# Patient Record
Sex: FEMALE | NOT HISPANIC OR LATINO | Employment: OTHER | URBAN - METROPOLITAN AREA
[De-identification: names, ages, dates, MRNs, and addresses within clinical notes are randomized per-mention and may not be internally consistent; named-entity substitution may affect disease eponyms.]

---

## 2017-04-21 DIAGNOSIS — Z13.6 SCREENING FOR CARDIOVASCULAR CONDITION: Primary | ICD-10-CM

## 2017-04-23 ASSESSMENT — ENCOUNTER SYMPTOMS
MYALGIAS: 0
MUSCLE WEAKNESS: 0
HOT FLASHES: 0
TASTE DISTURBANCE: 0
SINUS CONGESTION: 0
NECK MASS: 0
SORE THROAT: 0
MUSCLE CRAMPS: 0
STIFFNESS: 0
ARTHRALGIAS: 1
JOINT SWELLING: 0
NECK PAIN: 0
DECREASED LIBIDO: 1
HOARSE VOICE: 0
SINUS PAIN: 0
SMELL DISTURBANCE: 0
TROUBLE SWALLOWING: 0
BACK PAIN: 0

## 2017-04-24 ENCOUNTER — OFFICE VISIT (OUTPATIENT)
Dept: CARDIOLOGY | Facility: CLINIC | Age: 67
End: 2017-04-24

## 2017-04-24 VITALS
HEIGHT: 64 IN | HEART RATE: 71 BPM | BODY MASS INDEX: 31.24 KG/M2 | DIASTOLIC BLOOD PRESSURE: 86 MMHG | OXYGEN SATURATION: 97 % | WEIGHT: 183 LBS | SYSTOLIC BLOOD PRESSURE: 136 MMHG

## 2017-04-24 DIAGNOSIS — I10 ESSENTIAL HYPERTENSION: Primary | ICD-10-CM

## 2017-04-24 DIAGNOSIS — E78.49 FAMILIAL HYPERLIPIDEMIA: ICD-10-CM

## 2017-04-24 DIAGNOSIS — Z13.6 SCREENING FOR CARDIOVASCULAR CONDITION: ICD-10-CM

## 2017-04-24 LAB
CHOLEST SERPL-MCNC: 288 MG/DL
CREAT UR-MCNC: 83 MG/DL
CRP SERPL HS-MCNC: 4.5 MG/L
FEF 25/75: NORMAL
FEV-1: NORMAL
FEV1/FVC: NORMAL
FVC: NORMAL
GLUCOSE SERPL-MCNC: 86 MG/DL (ref 70–99)
HDLC SERPL-MCNC: 55 MG/DL
LDLC SERPL CALC-MCNC: 192 MG/DL
MICROALBUMIN UR-MCNC: 6 MG/L
MICROALBUMIN/CREAT UR: 7.33 MG/G CR (ref 0–25)
NONHDLC SERPL-MCNC: 233 MG/DL
NT-PROBNP SERPL-MCNC: 182 PG/ML (ref 0–125)
TRIGL SERPL-MCNC: 207 MG/DL

## 2017-04-24 RX ORDER — FLUOXETINE 40 MG/1
40 CAPSULE ORAL DAILY
COMMUNITY
Start: 1994-01-01 | End: 2022-06-30 | Stop reason: DRUGHIGH

## 2017-04-24 NOTE — LETTER
"4/24/2017    RE: Jeanie Pavon  [address]  [facility] [address]       Dear Colleague,    Thank you for the opportunity to participate in the care of your patient, Jeanie Pavon, at the [facility] FOR CARDIOVASCULAR DISEASE PREVENTION at [facility]. Please see a copy of my visit note below.    [facility] for Cardiovascular Disease Prevention - Exam Note    Active Problems   Patient Active Problem List    Diagnosis Date Noted     Familial hyperlipidemia      Priority: Medium     pt reports onset two years ago but labs from 2005 elevated LDL       Depression      Priority: Medium     Fatigue      Priority: Medium       Reason For Visit   Patient here for [facility] early detection of atherosclerosis and CVD exam.    Pain Evaluation  Current history of pain associated with this visit is: denied    HPI   Jeanie Pavon is a 67 year old year old female with a history of familial hyperlipidemia with LDL levels exceeding 200, mildly elevated triglycerides, meniere syndrome with coil placement in 1987, hx of alcohol abuse with no alcohol for 20 years and ongoing depression. Her brother recently had a cardiac arrest and survived and was found to have a \" maker\" lesion so she recently had a CAC scan with relatively low score of 27. Her provider prescribed Crestor which she has not started yet as she is wondering if she should take it with her low score. We discussed that given her familial hyperlipidemia she should take a statin but if Crestor/rosuvastastin although generic, still higher in cost may not be affordable for her atorvastatin could be considered. She has not chest pain or shortness of breath but reports palpitations about two months ago with heart racing too fast to count for about 20 minutes at night. No reoccurrence but she does not and then have a thudding for a few seconds. She is tired all of the time and does snore with possible sleep apnea. " She has some ascending aorta dilation, LA dilation and grade 1-2 diastolic filling pattern that may be influenced by sleep apnea. We discussed obtaining a sleep study.    Nutrition assessment per patient report:   Foods with fat/cholesterol (fried foods, fatty meats, junk food):  1 serving per day   Fruits and vegetables (  cup cooked, 1 cup raw):  4 servings per day  Caffeine (1 cup coffee, soda, etc):  1 serving per day  Alcohol servings (12 oz. beer, 4 oz. wine, 1  oz. in mixed drink):  0 servings  Calcium servings (dairy foods, 8 oz. milk, yogurt, cheese, ice cream):  3 servings per day  Salt/sodium use:  rarely  Special dietary habits:  None    Activity  Patient is active 3 times per week for 60 or more minutes with walking.     Laboratory Results Review  We discussed laboratory results today including lipids targets and how foods influence cholesterol.    Weight    A normal BMI of 25 is equal to 145 pounds.  The current BMI of 31.4 is high-risk range.  A weight reduction speed of two pounds per month for women  is recommended.    PMH   Past Medical History:   Diagnosis Date     Agatston coronary artery calcium score less than 100 2017    score of 27, 50-75% range     Depression      Familial hyperlipidemia     pt reports onset two years ago but labs from 2005 elevated LDL     Fatigue      History of alcohol abuse     no alcohol for 20 years     Meniere disease 1987    coil in right inner ear, endolymphatic shunt       PSH  Past Surgical History:   Procedure Laterality Date     DECOMPRESS, ENHANCE ENDOLYMPHATIC SAC Right 1987       Current Meds   Current Outpatient Prescriptions   Medication Sig Dispense Refill     FLUoxetine (PROZAC) 40 MG capsule Take 40 mg by mouth daily       aspirin 81 MG tablet Take 81 mg by mouth daily         Allergies      Allergies   Allergen Reactions     Codeine Anxiety and Palpitations       Family Hx   Family History   Problem Relation Age of Onset     Coronary Artery Disease  "Mother 60     CAB twice, CHF     KIDNEY DISEASE Mother      CEREBROVASCULAR DISEASE Father 88     Coronary Artery Disease Maternal Grandfather 60     Deep Vein Thrombosis Brother      Hyperlipidemia Brother      Coronary Artery Disease Brother      cardiac arrest survived  maker     HEART DISEASE Paternal Grandfather        Social History  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  She is  with 2 children. The children are over 18 years of age.     Enjoyment of life is 9-10 with 10 enjoys life fully.    Tobacco History  History   Smoking Status     Not on file   Smokeless Tobacco     Not on file       ROS  CONSTITUTIONAL:  No fever, chills, or sweats. No weight gain/loss.   EENT:  No visual disturbance, ear ache, epistaxis, sore throat  ALLERGIES/IMMUNOLOGIC:  Negative  RESPIRATORY:  No cough, hemoptysis  CARDIOVASCULAR:  As per HPI  GI:  No nausea, vomiting, hematemesis, melena  :  No urinary frequency, dysuria, or hematuria  INTEGUMENT:  Negative  PSYCHIATRIC:  Negative  NEURO:  Negative  ENDOCRINE:  Negative  MUSCULOSKELETAL:  Negative     Vital Signs   /86 (BP Location: Left arm, Patient Position: Chair, Cuff Size: Adult Regular)  Pulse 71  Ht 1.626 m (5' 4\")  Wt 83 kg (183 lb)  SpO2 97%  BMI 31.41 kg/m2      Waist: 37.5 inches  Hip: 43 inches    Physical Exam   In general, the patient is a pleasant female in no apparent distress.    HEENT: NC/AT.  PERRLA.  EOMI.  Sclerae white, not injected.  Nares clear.  Pharynx without erythema or exudate.  Dentition intact.    Neck: No adenopathy.  No thyromegaly.Carotids +4/4 bilaterally without bruits.  No jugular venous distension.   Lungs: CTA.  No ronchi, wheezes, rales.     Cor: RRR. Normal S1, S2 splits physiologically. No murmur, rub, click, or gallop. The PMI is in the 5th ICS in the midclavicular line. There is no heave.   Abdomen: Soft, nontender, nondistended. No organomegaly.  No bruits. "   Extremities: No clubbing, cyanosis, or edema. The pulses are +2/2 at the post-tibial sites bilaterally. No bruits are noted.    Recent Labs  Lab Results   Component Value Date    GLC 86 04/24/2017      Lab Results   Component Value Date    NTBNP 182 (H) 04/24/2017     No results found for: NTBNPI   Lab Results   Component Value Date    UCRR 83 04/24/2017      Lab Results   Component Value Date    MICROL 6 04/24/2017      No results found for: MICROALBUMIN   Lab Results   Component Value Date    CRP 4.5 04/24/2017      Lab Results   Component Value Date    CHOL 288 (H) 04/24/2017      Lab Results   Component Value Date    TRIG 207 (H) 04/24/2017      Lab Results   Component Value Date    HDL 55 04/24/2017      Lab Results   Component Value Date     (H) 04/24/2017      No results found for: VLDL   No results found for: CHOLHDLRATIO  Lab Results   Component Value Date    NHDL 233 (H) 04/24/2017                          Sheikh Test Results    BASIC SPIROMETRY: Summary of two attempts (see printout for details of results)  Results Estimated range for ht/age   FVC: 2.71 liter FVC: 2.34-3.68 liter   FEV1: 2.70 liter FEV1: 1.73-2.86 liter     History of asthma:  NO   History of respiratory infection current/recent:  NO     Spirometry Results:  normal      WALKING BLOOD PRESSURE RESPONSE (3 minute, 5 MET level walk)   Pre BP: 140/70 mmHg  3 min BP: 178/60 mmHg  1 min post BP: 160/80 mmHg    Pre HR: 70 bpm  3 min HR: 140 bpm  1 min post HR: 120 bpm       RETINAL VASCULAR ASSESSMENT   Left Eye Abnormality:  none  AV Ratio: 0.86    Right Eye Abnormality:  none  AV Ratio: 0.87     Retinal Assessment:  normal      ABDOMINAL AORTA ULTRASOUND (< 2.5 normal, borderline 2.5-2.9, abnormal > 3)   SupraIliac 1.61 cm    SupraRenal 1.78 cm    InfraRenal Proximal 1.59  cm    InfraRenal Distal 1.72 cm      Abdominal Aorta Assessment:  normal      LEFT VENTRICULAR ULTRASOUND MEASUREMENTS (adjusted for BSA)  LVIDD 47.7 mm   Septa  10.6 mm   Posterior 9.9 mm     Left Ventricular US Assessment:  normal      Carotid Artery IMT measurements report and plaques in the small area examined:   Left IMT 1.021 mm  Plaques none    Right IMT 0.709 mm  Plaques small plaque homogeneous size 2.9 mm in bulb area.  mm       ECG (see tracing):  normal sinus rhythm;  rate: 64 bpm      Arterial Elasticity per age and gender (see printout):   C1 9.65 mL/mmHg x 10  abnormal   C2 1.6 mL/mmHg x 100 abnormal   Supine blood pressure: 165/93 mmHg       Assessment:     Cardiovascular:  ECG normal sinus without ectopy rate 64. Low CAC score of 27, Echo with aortic dilation, LA dilation, diastolic dysfunction.  Sleep apnea may be an influencing factor as she is tired and does snore. Evaluation recommended. No hx of HTN but she does have elevated blood pressure now and then. No chest pain or shortness of breath. Dizziness now and then related to meniere. Hx of symptoms of tachycardia two months ago without reoccurrence. Consider event monitor should her symptoms continue. Hx of depression treated with Prozac effectively with enjoyment of life at 9-10. Sleep apnea if present may also increase depression  symptoms. TSH normal, Hg normal.     Blood Pressure:  Pre hypertensive to elevated range today (136/86 sitting to 165/93 supine) without antihypertensive medication. Arterial compliance/elaticity is low with increase risk for HTN.     Lipids:  LDL today at 192 consistent with previous results and familial hyperlipidemia. Mild triglyceride elevation with normal LFT and normal glucose. Provider prescribed Crestor which she has not started yet. She should start this provided it is affordable to her. She consumes a lot of bread and we discussed nutrition to support normal triglycerides and lower LDL.    Health Habit Summary:  Nutrition: Heart Healthy Eating:  some of the time   Exercise:  regularly active  Weight:  high-risk range  Tobacco Use:  past tobacco use; 40 year pk hx,  20 years 2 ppd, quit [id number] pack year history    Full report to follow prevention team review of test results with scanned final report.    Time spent for patient visit was 60 minutes with more than half the time spent on counseling and coordination of care.    MIREYA King CNP       CC  Patient Care Team:  Miryam Rai APRN CNP as Nurse Practitioner (Nurse Practitioner)  BARRY TOLENTINO            Answers for HPI/ROS submitted by the patient on 4/23/2017   General Symptoms: No  Skin Symptoms: No  HENT Symptoms: Yes  EYE SYMPTOMS: No  HEART SYMPTOMS: No  LUNG SYMPTOMS: No  INTESTINAL SYMPTOMS: No  URINARY SYMPTOMS: No  GYNECOLOGIC SYMPTOMS: Yes  BREAST SYMPTOMS: No  SKELETAL SYMPTOMS: Yes  BLOOD SYMPTOMS: No  NERVOUS SYSTEM SYMPTOMS: No  MENTAL HEALTH SYMPTOMS: No  Ear pain: No  Ear discharge: No  Hearing loss: Yes  Tinnitus: Yes  Nosebleeds: No  Congestion: No  Sinus pain: No  Trouble swallowing: No   Voice hoarseness: No  Mouth sores: No  Sore throat: No  Tooth pain: No  Gum tenderness: No  Bleeding gums: No  Change in taste: No  Change in sense of smell: No  Dry mouth: Yes  Hearing aid used: No  Neck lump: No  Back pain: No  Muscle aches: No  Neck pain: No  Swollen joints: No  Joint pain: Yes  Bone pain: No  Muscle cramps: No  Muscle weakness: No  Joint stiffness: No  Bone fracture: No  Bleeding or spotting between periods: No  Heavy or painful periods: No  Irregular periods: No  Vaginal discharge: No  Hot flashes: No  Vaginal dryness: Yes  Genital ulcers: No  Reduced libido: Yes  Painful intercourse: Yes  Difficulty with sexual arousal: Yes  Post-menopausal bleeding: No    Please do not hesitate to contact me if you have any questions/concerns.     Sincerely,     MIREYA King CNP

## 2017-04-24 NOTE — PROGRESS NOTES
"[facility] for Cardiovascular Disease Prevention - Exam Note    Active Problems   Patient Active Problem List    Diagnosis Date Noted     Familial hyperlipidemia      Priority: Medium     pt reports onset two years ago but labs from 2005 elevated LDL       Depression      Priority: Medium     Fatigue      Priority: Medium       Reason For Visit   Patient here for [facility] early detection of atherosclerosis and CVD exam.    Pain Evaluation  Current history of pain associated with this visit is: denied    HPI   Jeanie Pavon is a 67 year old year old female with a history of familial hyperlipidemia with LDL levels exceeding 200, mildly elevated triglycerides, meniere syndrome with coil placement in 1987, hx of alcohol abuse with no alcohol for 20 years and ongoing depression. Her brother recently had a cardiac arrest and survived and was found to have a \" maker\" lesion so she recently had a CAC scan with relatively low score of 27. Her provider prescribed Crestor which she has not started yet as she is wondering if she should take it with her low score. We discussed that given her familial hyperlipidemia she should take a statin but if Crestor/rosuvastastin although generic, still higher in cost may not be affordable for her atorvastatin could be considered. She has not chest pain or shortness of breath but reports palpitations about two months ago with heart racing too fast to count for about 20 minutes at night. No reoccurrence but she does not and then have a thudding for a few seconds. She is tired all of the time and does snore with possible sleep apnea. She has some ascending aorta dilation, LA dilation and grade 1-2 diastolic filling pattern that may be influenced by sleep apnea. We discussed obtaining a sleep study.    Nutrition assessment per patient report:   Foods with fat/cholesterol (fried foods, fatty meats, junk food):  1 serving per day   Fruits and vegetables (  cup cooked, 1 cup raw):  4 " servings per day  Caffeine (1 cup coffee, soda, etc):  1 serving per day  Alcohol servings (12 oz. beer, 4 oz. wine, 1  oz. in mixed drink):  0 servings  Calcium servings (dairy foods, 8 oz. milk, yogurt, cheese, ice cream):  3 servings per day  Salt/sodium use:  rarely  Special dietary habits:  None    Activity  Patient is active 3 times per week for 60 or more minutes with walking.     Laboratory Results Review  We discussed laboratory results today including lipids targets and how foods influence cholesterol.    Weight    A normal BMI of 25 is equal to 145 pounds.  The current BMI of 31.4 is high-risk range.  A weight reduction speed of two pounds per month for women  is recommended.    PMH   Past Medical History:   Diagnosis Date     Agatston coronary artery calcium score less than 100 2017    score of 27, 50-75% range     Depression      Familial hyperlipidemia     pt reports onset two years ago but labs from 2005 elevated LDL     Fatigue      History of alcohol abuse     no alcohol for 20 years     Meniere disease 1987    coil in right inner ear, endolymphatic shunt       PSH  Past Surgical History:   Procedure Laterality Date     DECOMPRESS, ENHANCE ENDOLYMPHATIC SAC Right 1987       Current Meds   Current Outpatient Prescriptions   Medication Sig Dispense Refill     FLUoxetine (PROZAC) 40 MG capsule Take 40 mg by mouth daily       aspirin 81 MG tablet Take 81 mg by mouth daily         Allergies      Allergies   Allergen Reactions     Codeine Anxiety and Palpitations       Family Hx   Family History   Problem Relation Age of Onset     Coronary Artery Disease Mother 60     CAB twice, CHF     KIDNEY DISEASE Mother      CEREBROVASCULAR DISEASE Father 88     Coronary Artery Disease Maternal Grandfather 60     Deep Vein Thrombosis Brother      Hyperlipidemia Brother      Coronary Artery Disease Brother      cardiac arrest survived  maker     HEART DISEASE Paternal Grandfather        Social History  Jeanie casas  "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  She is  with 2 children. The children are over 18 years of age.     Enjoyment of life is 9-10 with 10 enjoys life fully.    Tobacco History  History   Smoking Status     Not on file   Smokeless Tobacco     Not on file       ROS  CONSTITUTIONAL:  No fever, chills, or sweats. No weight gain/loss.   EENT:  No visual disturbance, ear ache, epistaxis, sore throat  ALLERGIES/IMMUNOLOGIC:  Negative  RESPIRATORY:  No cough, hemoptysis  CARDIOVASCULAR:  As per HPI  GI:  No nausea, vomiting, hematemesis, melena  :  No urinary frequency, dysuria, or hematuria  INTEGUMENT:  Negative  PSYCHIATRIC:  Negative  NEURO:  Negative  ENDOCRINE:  Negative  MUSCULOSKELETAL:  Negative     Vital Signs   /86 (BP Location: Left arm, Patient Position: Chair, Cuff Size: Adult Regular)  Pulse 71  Ht 1.626 m (5' 4\")  Wt 83 kg (183 lb)  SpO2 97%  BMI 31.41 kg/m2      Waist: 37.5 inches  Hip: 43 inches    Physical Exam   In general, the patient is a pleasant female in no apparent distress.    HEENT: NC/AT.  PERRLA.  EOMI.  Sclerae white, not injected.  Nares clear.  Pharynx without erythema or exudate.  Dentition intact.    Neck: No adenopathy.  No thyromegaly.Carotids +4/4 bilaterally without bruits.  No jugular venous distension.   Lungs: CTA.  No ronchi, wheezes, rales.     Cor: RRR. Normal S1, S2 splits physiologically. No murmur, rub, click, or gallop. The PMI is in the 5th ICS in the midclavicular line. There is no heave.   Abdomen: Soft, nontender, nondistended. No organomegaly.  No bruits.   Extremities: No clubbing, cyanosis, or edema. The pulses are +2/2 at the post-tibial sites bilaterally. No bruits are noted.    Recent Labs  Lab Results   Component Value Date    GLC 86 04/24/2017      Lab Results   Component Value Date    NTBNP 182 (H) 04/24/2017     No results found for: NTBNPI   Lab Results   Component Value Date    UCRR 83 04/24/2017      Lab " Results   Component Value Date    MICROL 6 04/24/2017      No results found for: MICROALBUMIN   Lab Results   Component Value Date    CRP 4.5 04/24/2017      Lab Results   Component Value Date    CHOL 288 (H) 04/24/2017      Lab Results   Component Value Date    TRIG 207 (H) 04/24/2017      Lab Results   Component Value Date    HDL 55 04/24/2017      Lab Results   Component Value Date     (H) 04/24/2017      No results found for: VLDL   No results found for: CHOLHDLRATIO  Lab Results   Component Value Date    NHDL 233 (H) 04/24/2017                          Sheikh Test Results    BASIC SPIROMETRY: Summary of two attempts (see printout for details of results)  Results Estimated range for ht/age   FVC: 2.71 liter FVC: 2.34-3.68 liter   FEV1: 2.70 liter FEV1: 1.73-2.86 liter     History of asthma:  NO   History of respiratory infection current/recent:  NO     Spirometry Results:  normal      WALKING BLOOD PRESSURE RESPONSE (3 minute, 5 MET level walk)   Pre BP: 140/70 mmHg  3 min BP: 178/60 mmHg  1 min post BP: 160/80 mmHg    Pre HR: 70 bpm  3 min HR: 140 bpm  1 min post HR: 120 bpm       RETINAL VASCULAR ASSESSMENT   Left Eye Abnormality:  none  AV Ratio: 0.86    Right Eye Abnormality:  none  AV Ratio: 0.87     Retinal Assessment:  normal      ABDOMINAL AORTA ULTRASOUND (< 2.5 normal, borderline 2.5-2.9, abnormal > 3)   SupraIliac 1.61 cm    SupraRenal 1.78 cm    InfraRenal Proximal 1.59  cm    InfraRenal Distal 1.72 cm      Abdominal Aorta Assessment:  normal      LEFT VENTRICULAR ULTRASOUND MEASUREMENTS (adjusted for BSA)  LVIDD 47.7 mm   Septa 10.6 mm   Posterior 9.9 mm     Left Ventricular US Assessment:  normal      Carotid Artery IMT measurements report and plaques in the small area examined:   Left IMT 1.021 mm  Plaques none    Right IMT 0.709 mm  Plaques small plaque homogeneous size 2.9 mm in bulb area.  mm       ECG (see tracing):  normal sinus rhythm;  rate: 64 bpm      Arterial Elasticity per age  and gender (see printout):   C1 9.65 mL/mmHg x 10  abnormal   C2 1.6 mL/mmHg x 100 abnormal   Supine blood pressure: 165/93 mmHg       Assessment:     Cardiovascular:  ECG normal sinus without ectopy rate 64. Low CAC score of 27, Echo with aortic dilation, LA dilation, diastolic dysfunction.  Sleep apnea may be an influencing factor as she is tired and does snore. Evaluation recommended. No hx of HTN but she does have elevated blood pressure now and then. No chest pain or shortness of breath. Dizziness now and then related to meniere. Hx of symptoms of tachycardia two months ago without reoccurrence. Consider event monitor should her symptoms continue. Hx of depression treated with Prozac effectively with enjoyment of life at 9-10. Sleep apnea if present may also increase depression  symptoms. TSH normal, Hg normal.     Blood Pressure:  Pre hypertensive to elevated range today (136/86 sitting to 165/93 supine) without antihypertensive medication. Arterial compliance/elaticity is low with increase risk for HTN.     Lipids:  LDL today at 192 consistent with previous results and familial hyperlipidemia. Mild triglyceride elevation with normal LFT and normal glucose. Provider prescribed Crestor which she has not started yet. She should start this provided it is affordable to her. She consumes a lot of bread and we discussed nutrition to support normal triglycerides and lower LDL.    Health Habit Summary:  Nutrition: Heart Healthy Eating:  some of the time   Exercise:  regularly active  Weight:  high-risk range  Tobacco Use:  past tobacco use; 40 year pk hx, 20 years 2 ppd, quit [id number] pack year history    Full report to follow prevention team review of test results with scanned final report.    Time spent for patient visit was 60 minutes with more than half the time spent on counseling and coordination of care.    MIREYA King CNP       CC  Patient Care Team:  Miryam Rai APRN CNP as Nurse Practitioner  (Nurse Practitioner)  BARRY TOLENTINO  Answers for HPI/ROS submitted by the patient on 4/23/2017   General Symptoms: No  Skin Symptoms: No  HENT Symptoms: Yes  EYE SYMPTOMS: No  HEART SYMPTOMS: No  LUNG SYMPTOMS: No  INTESTINAL SYMPTOMS: No  URINARY SYMPTOMS: No  GYNECOLOGIC SYMPTOMS: Yes  BREAST SYMPTOMS: No  SKELETAL SYMPTOMS: Yes  BLOOD SYMPTOMS: No  NERVOUS SYSTEM SYMPTOMS: No  MENTAL HEALTH SYMPTOMS: No  Ear pain: No  Ear discharge: No  Hearing loss: Yes  Tinnitus: Yes  Nosebleeds: No  Congestion: No  Sinus pain: No  Trouble swallowing: No   Voice hoarseness: No  Mouth sores: No  Sore throat: No  Tooth pain: No  Gum tenderness: No  Bleeding gums: No  Change in taste: No  Change in sense of smell: No  Dry mouth: Yes  Hearing aid used: No  Neck lump: No  Back pain: No  Muscle aches: No  Neck pain: No  Swollen joints: No  Joint pain: Yes  Bone pain: No  Muscle cramps: No  Muscle weakness: No  Joint stiffness: No  Bone fracture: No  Bleeding or spotting between periods: No  Heavy or painful periods: No  Irregular periods: No  Vaginal discharge: No  Hot flashes: No  Vaginal dryness: Yes  Genital ulcers: No  Reduced libido: Yes  Painful intercourse: Yes  Difficulty with sexual arousal: Yes  Post-menopausal bleeding: No

## 2017-04-28 RX ORDER — VALSARTAN 80 MG/1
80 TABLET ORAL DAILY
Qty: 90 TABLET | Refills: 0 | Status: SHIPPED | OUTPATIENT
Start: 2017-04-28 | End: 2019-12-09

## 2017-05-03 LAB — INTERPRETATION ECG - MUSE: NORMAL

## 2017-08-21 ENCOUNTER — RADIANT APPOINTMENT (OUTPATIENT)
Dept: GENERAL RADIOLOGY | Facility: CLINIC | Age: 67
End: 2017-08-21
Attending: PODIATRIST
Payer: COMMERCIAL

## 2017-08-21 ENCOUNTER — OFFICE VISIT (OUTPATIENT)
Dept: PODIATRY | Facility: CLINIC | Age: 67
End: 2017-08-21
Payer: COMMERCIAL

## 2017-08-21 VITALS
SYSTOLIC BLOOD PRESSURE: 114 MMHG | DIASTOLIC BLOOD PRESSURE: 62 MMHG | BODY MASS INDEX: 30.39 KG/M2 | WEIGHT: 178 LBS | HEIGHT: 64 IN

## 2017-08-21 DIAGNOSIS — M25.572 PAIN IN JOINTS OF BOTH FEET: ICD-10-CM

## 2017-08-21 DIAGNOSIS — M20.5X9 HALLUX LIMITUS, UNSPECIFIED LATERALITY: Primary | ICD-10-CM

## 2017-08-21 DIAGNOSIS — M25.571 PAIN IN JOINTS OF BOTH FEET: ICD-10-CM

## 2017-08-21 DIAGNOSIS — M20.12 HALLUX ABDUCTO VALGUS, BILATERAL: ICD-10-CM

## 2017-08-21 DIAGNOSIS — M20.11 HALLUX ABDUCTO VALGUS, BILATERAL: ICD-10-CM

## 2017-08-21 DIAGNOSIS — M20.12 HALLUX INTERPHALANGEUS, ACQUIRED, LEFT: ICD-10-CM

## 2017-08-21 DIAGNOSIS — M20.5X9 HALLUX LIMITUS, UNSPECIFIED LATERALITY: ICD-10-CM

## 2017-08-21 PROCEDURE — 99203 OFFICE O/P NEW LOW 30 MIN: CPT | Performed by: PODIATRIST

## 2017-08-21 PROCEDURE — 73630 X-RAY EXAM OF FOOT: CPT | Mod: LT

## 2017-08-21 PROCEDURE — 73630 X-RAY EXAM OF FOOT: CPT | Mod: RT

## 2017-08-21 NOTE — NURSING NOTE
"Chief Complaint   Patient presents with     Foot Problems     pain and swelling below both great toes x25 years       Initial /62 (BP Location: Left arm, Cuff Size: Adult Regular)  Ht 5' 4\" (1.626 m)  Wt 178 lb (80.7 kg)  BMI 30.55 kg/m2 Estimated body mass index is 30.55 kg/(m^2) as calculated from the following:    Height as of this encounter: 5' 4\" (1.626 m).    Weight as of this encounter: 178 lb (80.7 kg).  Medication Reconciliation: complete   Maritza Benson MA      "

## 2017-08-21 NOTE — MR AVS SNAPSHOT
After Visit Summary   8/21/2017    Jeanie Pavon    MRN: [id number]           Patient Information     Date Of Birth          1950        Visit Information        Provider Department      8/21/2017 8:15 AM Lavelle Segundo DPM [facility]        Today's Diagnoses     Hallux abducto valgus, bilateral    -  1    Hallux limitus, unspecified laterality        Pain in joints of both feet          Care Instructions    DR. SEGUNDO'S CLINIC LOCATIONS     MONDAY / FRIDAY - [facility] WEDNESDAY - [place]   [address] [address] [place] [address]   [phone] / [phone] / [phone]       THURSDAY - [place] SCHEDULE SURGERY: [phone]   [address] APPOINTMENTS: [phone]   [place] [address] AFTER HOURS: [phone] / [phone] BILLING QUESTIONS: [phone]        Body Mass Index (BMI)  Many things can cause foot and ankle problems. Foot structure, activity level, foot mechanics and injuries are common causes of pain.  One very important issue that often goes unmentioned, is body weight.  Extra weight can cause increased stress on muscles, ligaments, bones and tendons.  Sometimes just a few extra pounds is all it takes to put one over her/his threshold. Without reducing that stress, it can be difficult to alleviate pain. Some people are uncomfortable addressing this issue, but we feel it is important for you to think about it. As Foot &  Ankle specialists, our job is addressing the lower extremity problem and possible causes. Regarding extra body weight, we encourage patients to discuss diet and weight management plans with their primary care doctors. It is this team approach that gives you the best opportunity for pain relief and getting you back on your feet.      DEGENERATIVE ARTHRITIS OF THE BIG TOE JOINT   (hallux limitus/hallux rigidus)   Arthritis of the joint at the  "base of the big toe (metatarsophalangeal joint) has several causes. Usually it results from repetitive trauma to the joint, secondary to abnormal foot mechanics. Often it is hereditary. However, a one-time traumatic event can lead to arthritis. The condition doesn't improve with time, and even with treatment, can worsen. The cartilage wears out, joint surfaces are no longer smooth, bone rubs on bone, inflammation occurs with pain, and eventually bone spurs and loose fragments might develop.   The joint is often painful with activity, worse with flimsy shoes or walking barefoot, and it slowly progresses over time. A person might notice the toe \"locking up\" with walking. There often is an obvious, and irritating, bony bump on top of the foot. Shoes might be uncomfortable. In some people the pain is so bothersome that recreational activities sometimes even normal daily activities are difficult to perform.   The pain from this arthritis is likely a combination of joint jamming, cartilage loss and inflammation, and irritation from shoes rubbing on the bump. Sometimes other parts of the foot, leg, or back hurt from altering one's walk to compensate for the painful jOint.     Ways to help a person live with the discomfort include wearing a good, supportive shoe with a rigid, rocker-type bottom. An example is a hiking boot. A rigid sole minimizes bending of the joint, and therefore, joint motion and pain. Shoes with a high toe box allow for less rubbing on the bump. Avoiding barefoot walking, sandals, flip-flops and slippers usually helps.   Sometimes an insert or orthotic provides symptom relief. This might make shoe fit more difficult. Pads over the bump and occasionally injections into the joint provide relief.   Surgery for this condition is aimed towards alleviating pain. It does not cure the arthritis nor does it guarantee better joint motion. Depending on the condition of the metatarsophalangeal joint, there are " several surgiqal options:    1.  Cutting off the bony bump(s) and cleaning the joint    2.  Loosening the joint up by making cuts in the first metatarsal bone or the big toe bone and removing a small section of bone.    3.  Repositioning bone to minimize jamming of the joint.    4.  In severe cases, the joint is fused. By fusing the joint, it will never bend again. This resolves the pain, because it's the movement of a worn out jOint that causes pain. Oftentimes the operation involves a combination of these procedures and. requires the use of screws, pins, and/or a small surgical plate.     Healing after surgery requires about six weeks of protection. This allows the bone to heal. Maximum recovery takes about one year. The scar tissue and joint structures require this amount of time to finish the healing process. Expect stiffness, swelling and numbness during that time frame.   Surgery for arthritis of the metatarsophalangeal joint does involve side effects. Some side effects are predictable and others are less common but do occur. A scar will be visible and could be irritated by shoes. The shoe may rub on the screw or internal pin requiring surgical removal of these fixation devices. The screw and pin would likely be left in place for a full year. The first toe may remain stiff after surgery. The amount of stiffness is variable. Most people never regain normal motion of the first toe. This is due to scar tissue inherent to any surgery, in addition to the cumUlative effects of arthritis. Sometimes the big toe drifts to one side or the other. Joint fusion is one option to correct an unstable, drifting toe. This procedure straightens the toe, however, no motion remains.   All surgical procedures involve risk of infection, numbness, pain, delayed bone healing, osteotomy (bone cut) dislocation, blood clots, continued foot pain, etc. Arthritic joint surgery is quite complex and should not be taken lightly.    Any skin  incision can lead to infection. Deep infection might involve the bone and thus repeat surgery and six weeks of IV antibiotics. Scar tissue can cause nerve pain or numbness. his is generally temporary but can be permanent. We do not have treatments that cure nerve problems. Second toe pain could be related to altered mechanics and pressure transferred to the second toe. Delayed bone healing would lengthen the healing time. Some bones simply do not heal. This requires repeat surgery, electronic bone stimulation and/or extended protection. Smokers have an approximate 20% chance of poor bone healing. This is double that of a non-smoker. The bone cut may displace. This may need to be repaired with a second operation. Displacement can cause joint malalignment. Immobility after surgery can cause a blood clot in the legs and lungs. This could result in death.   Foot pain is complex. Most feet hurt for more than one reason. Operating on the arthritic   big toe joint will not necessarily create a pain free foot. Appropriate shoes, healthy body weight, avoidance of bare foot walking and moderation of activity will always be   necessary to enjoy foot comfort. Arthritis is incurable even with surgery.     Surgery for this type of arthritis is nevertheless quite successful. Most surgical patients are pleased with their foot following surgery. Many of the issues described above can be controlled by taking proper care of your foot during the healing process.   Cosmetic bump surgery is discouraged for the reasons listed above. A bump and joint that is comfortable when wearing appropriate shoes should simply be treated with appropriate shoes.   Your surgeon would be happy to fully describe any of the above issues. You should pursue a full understanding of the operation, recovery process and any potential problems that could develop.             Follow-ups after your visit        Who to contact     If you have questions or need follow  "up information about today's clinic visit or your schedule please contact [facility] directly at [phone].  Normal or non-critical lab and imaging results will be communicated to you by MyChart, letter or phone within 4 business days after the clinic has received the results. If you do not hear from us within 7 days, please contact the clinic through [employer] or phone. If you have a critical or abnormal lab result, we will notify you by phone as soon as possible.  Submit refill requests through [employer] or call your pharmacy and they will forward the refill request to us. Please allow 3 business days for your refill to be completed.          Additional Information About Your Visit        My[employer] Information     [employer] gives you secure access to your electronic health record. If you see a primary care provider, you can also send messages to your care team and make appointments. If you have questions, please call your primary care clinic.  If you do not have a primary care provider, please call [phone] and they will assist you.        Care EveryWhere ID     This is your Care EveryWhere ID. This could be used by other organizations to access your [place] medical records  [id number]        Your Vitals Were     Height BMI (Body Mass Index)                5' 4\" (1.626 m) 30.55 kg/m2           Blood Pressure from Last 3 Encounters:   08/21/17 114/62   04/24/17 136/86    Weight from Last 3 Encounters:   08/21/17 178 lb (80.7 kg)   04/24/17 183 lb (83 kg)              We Performed the Following     XR Foot Left G/E 3 Views        Primary Care Provider    None Specified       No primary provider on file.        Equal Access to Services     [facility] : Hadii jaime Clements, waromida luqadaha, qaybta kaalmasmita alonzo. So [facility] [phone].    ATENCIÓN: Si habla español, tiene a guardado disposición servicios gratuitos de asistencia lingüística. " Nicole prieto [phone].    We comply with applicable federal civil rights laws and [place] laws. We do not discriminate on the basis of race, color, national origin, age, disability sex, sexual orientation or gender identity.            Thank you!     Thank you for choosing [facility]  for your care. Our goal is always to provide you with excellent care. Hearing back from our patients is one way we can continue to improve our services. Please take a few minutes to complete the written survey that you may receive in the mail after your visit with us. Thank you!             Your Updated Medication List - Protect others around you: Learn how to safely use, store and throw away your medicines at www.disposemymeds.org.          This list is accurate as of: 8/21/17  8:55 AM.  Always use your most recent med list.                   Brand Name Dispense Instructions for use Diagnosis    aspirin 81 MG tablet      Take 81 mg by mouth daily    Familial hyperlipidemia       PROZAC 40 MG capsule   Generic drug:  FLUoxetine      Take 40 mg by mouth daily    Familial hyperlipidemia       valsartan 80 MG tablet    DIOVAN    90 tablet    Take 1 tablet (80 mg) by mouth daily    Essential hypertension

## 2017-08-21 NOTE — PATIENT INSTRUCTIONS
DR. SEGUNDO'S CLINIC LOCATIONS     MONDAY / FRIDAY - [facility] WEDNESDAY - [place]   [address] [place] [address] [place] [id number]   [phone] / [phone] / [phone]       THURSDAY - [place] SCHEDULE SURGERY: [phone]   [address] APPOINTMENTS: [phone]   [place] [address] AFTER HOURS: [phone] BILLING QUESTIONS: [phone]        Body Mass Index (BMI)  Many things can cause foot and ankle problems. Foot structure, activity level, foot mechanics and injuries are common causes of pain.  One very important issue that often goes unmentioned, is body weight.  Extra weight can cause increased stress on muscles, ligaments, bones and tendons.  Sometimes just a few extra pounds is all it takes to put one over her/his threshold. Without reducing that stress, it can be difficult to alleviate pain. Some people are uncomfortable addressing this issue, but we feel it is important for you to think about it. As Foot &  Ankle specialists, our job is addressing the lower extremity problem and possible causes. Regarding extra body weight, we encourage patients to discuss diet and weight management plans with their primary care doctors. It is this team approach that gives you the best opportunity for pain relief and getting you back on your feet.      DEGENERATIVE ARTHRITIS OF THE BIG TOE JOINT   (hallux limitus/hallux rigidus)   Arthritis of the joint at the base of the big toe (metatarsophalangeal joint) has several causes. Usually it results from repetitive trauma to the joint, secondary to abnormal foot mechanics. Often it is hereditary. However, a one-time traumatic event can lead to arthritis. The condition doesn't improve with time, and even with treatment, can worsen. The cartilage wears out, joint surfaces are no longer smooth, bone rubs on bone, inflammation occurs with pain, and eventually bone spurs  "and loose fragments might develop.   The joint is often painful with activity, worse with flimsy shoes or walking barefoot, and it slowly progresses over time. A person might notice the toe \"locking up\" with walking. There often is an obvious, and irritating, bony bump on top of the foot. Shoes might be uncomfortable. In some people the pain is so bothersome that recreational activities sometimes even normal daily activities are difficult to perform.   The pain from this arthritis is likely a combination of joint jamming, cartilage loss and inflammation, and irritation from shoes rubbing on the bump. Sometimes other parts of the foot, leg, or back hurt from altering one's walk to compensate for the painful jOint.     Ways to help a person live with the discomfort include wearing a good, supportive shoe with a rigid, rocker-type bottom. An example is a hiking boot. A rigid sole minimizes bending of the joint, and therefore, joint motion and pain. Shoes with a high toe box allow for less rubbing on the bump. Avoiding barefoot walking, sandals, flip-flops and slippers usually helps.   Sometimes an insert or orthotic provides symptom relief. This might make shoe fit more difficult. Pads over the bump and occasionally injections into the joint provide relief.   Surgery for this condition is aimed towards alleviating pain. It does not cure the arthritis nor does it guarantee better joint motion. Depending on the condition of the metatarsophalangeal joint, there are several surgiqal options:    1.  Cutting off the bony bump(s) and cleaning the joint    2.  Loosening the joint up by making cuts in the first metatarsal bone or the big toe bone and removing a small section of bone.    3.  Repositioning bone to minimize jamming of the joint.    4.  In severe cases, the joint is fused. By fusing the joint, it will never bend again. This resolves the pain, because it's the movement of a worn out jOint that causes pain. " Oftentimes the operation involves a combination of these procedures and. requires the use of screws, pins, and/or a small surgical plate.     Healing after surgery requires about six weeks of protection. This allows the bone to heal. Maximum recovery takes about one year. The scar tissue and joint structures require this amount of time to finish the healing process. Expect stiffness, swelling and numbness during that time frame.   Surgery for arthritis of the metatarsophalangeal joint does involve side effects. Some side effects are predictable and others are less common but do occur. A scar will be visible and could be irritated by shoes. The shoe may rub on the screw or internal pin requiring surgical removal of these fixation devices. The screw and pin would likely be left in place for a full year. The first toe may remain stiff after surgery. The amount of stiffness is variable. Most people never regain normal motion of the first toe. This is due to scar tissue inherent to any surgery, in addition to the cumUlative effects of arthritis. Sometimes the big toe drifts to one side or the other. Joint fusion is one option to correct an unstable, drifting toe. This procedure straightens the toe, however, no motion remains.   All surgical procedures involve risk of infection, numbness, pain, delayed bone healing, osteotomy (bone cut) dislocation, blood clots, continued foot pain, etc. Arthritic joint surgery is quite complex and should not be taken lightly.    Any skin incision can lead to infection. Deep infection might involve the bone and thus repeat surgery and six weeks of IV antibiotics. Scar tissue can cause nerve pain or numbness. his is generally temporary but can be permanent. We do not have treatments that cure nerve problems. Second toe pain could be related to altered mechanics and pressure transferred to the second toe. Delayed bone healing would lengthen the healing time. Some bones simply do not heal.  This requires repeat surgery, electronic bone stimulation and/or extended protection. Smokers have an approximate 20% chance of poor bone healing. This is double that of a non-smoker. The bone cut may displace. This may need to be repaired with a second operation. Displacement can cause joint malalignment. Immobility after surgery can cause a blood clot in the legs and lungs. This could result in death.   Foot pain is complex. Most feet hurt for more than one reason. Operating on the arthritic   big toe joint will not necessarily create a pain free foot. Appropriate shoes, healthy body weight, avoidance of bare foot walking and moderation of activity will always be   necessary to enjoy foot comfort. Arthritis is incurable even with surgery.     Surgery for this type of arthritis is nevertheless quite successful. Most surgical patients are pleased with their foot following surgery. Many of the issues described above can be controlled by taking proper care of your foot during the healing process.   Cosmetic bump surgery is discouraged for the reasons listed above. A bump and joint that is comfortable when wearing appropriate shoes should simply be treated with appropriate shoes.   Your surgeon would be happy to fully describe any of the above issues. You should pursue a full understanding of the operation, recovery process and any potential problems that could develop.

## 2017-08-21 NOTE — PROGRESS NOTES
ASSESSMENT/PLAN:    Encounter Diagnoses   Name Primary?     Hallux limitus, unspecified laterality Yes     Pain in joints of both feet      Hallux interphalangeus, acquired, left        The cause and nature of hallux limitus/1st metatarsophalangeal joint degenerative joint disease was discussed.  An informational handout was provided.      I recommended that she try stiffer soled shoes with good support.   The AVS on hallux limitus was provided. We looked at the x-rays together.     We also reviewed surgical intervention.  .  I explained that the goals of surgery are to reduce pain by loosening up the joint, promoteing increased ROM, and removing spurs.  However, it was made clear that DJD is progressive, even after surgery.  Surgery is meant to buy time, and ultimately a joint fusion might be needed.      Body mass index is 30.55 kg/(m^2).    Weight management plan: Patient was referred to their PCP to discuss a diet and exercise plan.      Lavelle Rico DPM, FACFAS, MS    [facility] of Podiatry/Foot & Ankle Surgery      ____________________________________________________________________    HPI:          Chief Complaint: sore feet  Onset of problem: 20 years  Painful around the bilateral 1st metatarsophalangeal joints  Ratin/10 at worst   Frequency:  daily    The pain is made worse with weight bearing activity  Both feet hurt equally     *  Past Medical History:   Diagnosis Date     Agatston coronary artery calcium score less than 100 2017    score of 27, 50-75% range     Depression      Familial hyperlipidemia     pt reports onset two years ago but labs from  elevated LDL     Fatigue      History of alcohol abuse     no alcohol for 20 years     Meniere disease     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  All other areas are negative.     Numbness in feet?  no   Calf pain with walking? no  Recent foot/ankle injury? no  Weight change over past 12 months? 10#  Self perception as overweight? yes  Recent flu-like symptoms? no  Joint pain other than feet ? hips    Social History: Employment:  no;  Exercise/Physical activity:  Walking 3x/ week; biking;  Tobacco use:  no  Social History     Social History     Marital status:      Spouse name: N/A     Number of children: N/A     Years of education: N/A     Occupational History     Not on file.     Social History Main Topics     Smoking status: Never Smoker     Smokeless tobacco: Not on file     Alcohol use Not on file     Drug use: Not on file     Sexual activity: Not on file     Other Topics Concern     Not on file     Social History Narrative       Family history:  Family History   Problem Relation Age of Onset     Coronary Artery Disease Mother 60     CAB twice, CHF     KIDNEY DISEASE Mother      CEREBROVASCULAR DISEASE Father 88     Coronary Artery Disease Maternal Grandfather 60     Deep Vein Thrombosis Brother      Hyperlipidemia Brother      Coronary Artery Disease Brother      cardiac arrest survived  maker     HEART DISEASE Paternal Grandfather        EXAM:    Vitals: /62 (BP Location: Left arm, Cuff Size: Adult Regular)  Ht 5' 4\" (1.626 m)  Wt 178 lb (80.7 kg)  BMI 30.55 kg/m2  BMI: Body mass index is 30.55 kg/(m^2).  Height: 5' 4\"    Constitutional/ general:  Pt is in no apparent distress, appears well-nourished.  Cooperative with history and physical exam.     Vascular:  " Pedal pulses are palpable bilaterally for both the DP and PT arteries.  CFT < 3 sec.  No edema.  Pedal hair growth noted.     Neuro:  Alert and oriented x 3. Coordinated gait.  Light touch sensation is intact to the L4, L5, S1 distributions. No obvious deficits.  No evidence of neurological-based weakness, spasticity, or contracture in the lower extremities.     Derm: Normal texture and turgor.  No erythema, ecchymosis, or cyanosis.  No open lesions.     Musculoskeletal:    Lower extremity muscle strength is normal.  Patient is ambulatory without an assistive device or brace .  Decrease in medial longitudinal arch with weight bearing.   Hallux abduction bilateral.  Limited bilateral 1st metatarsophalangeal joint ROM with loading of the forefoot.     Radiographic Exam:  X-Ray Findings:  I personally reviewed the films.   Both feet show joint space narrowing, sclerosis, dorsal spurring at the 1st metatarsophalangeal joint.   The left hallux has a significant interphalangeal joint deformity.    Lavelle Rico DPM, DIRK, MS    [place] Department of Podiatry/Foot & Ankle Surgery

## 2019-02-22 ENCOUNTER — OFFICE VISIT (OUTPATIENT)
Dept: PLASTIC SURGERY | Facility: CLINIC | Age: 69
End: 2019-02-22
Payer: COMMERCIAL

## 2019-02-22 DIAGNOSIS — H02.831 DERMATOCHALASIS OF BOTH UPPER EYELIDS: Primary | ICD-10-CM

## 2019-02-22 DIAGNOSIS — H57.819 BROW PTOSIS: ICD-10-CM

## 2019-02-22 DIAGNOSIS — H02.834 DERMATOCHALASIS OF BOTH UPPER EYELIDS: Primary | ICD-10-CM

## 2019-02-22 NOTE — PROGRESS NOTES
Facial Plastic and Reconstructive Surgery Consultation    Referring Provider:   Referred Self, MD  No address on file    HPI:   I had the pleasure of seeing Jeanie Pavon today in clinic for consultation for visual obstruction in the periorbital area.    FUNCTIONAL COMPLAINTS RELATED TO DROOPY EYELIDS/BROWS:  Jeanie Pavon describes upper lids interfering with superior visual field and interfering with activities of daily living including reading, driving and watching television. She has to support her eyelids and brows in order to be able to read and finds that it leads to a significant improvement.     Denies dry eyes and does not use drops or ointment. States she can make tears but hasnt cried rené long time and believes this is due to emotional numbing from prozac.    PMH: recovering alcoholic/drug dependence, Meniere's disease   PSH: Surgery for meniere's disease 1987      Review Of Systems  ROS: 10 point ROS neg other than the symptoms noted above in the HPI.    Patient Active Problem List   Diagnosis     Familial hyperlipidemia     Depression     Fatigue     Hallux limitus, unspecified laterality     Past Surgical History:   Procedure Laterality Date     DECOMPRESS, ENHANCE ENDOLYMPHATIC SAC Right 1987     Current Outpatient Medications   Medication Sig Dispense Refill     aspirin 81 MG tablet Take 81 mg by mouth daily       FLUoxetine (PROZAC) 40 MG capsule Take 40 mg by mouth daily       valsartan (DIOVAN) 80 MG tablet Take 1 tablet (80 mg) by mouth daily (Patient not taking: Reported on 8/21/2017) 90 tablet 0     Codeine  Social History     Socioeconomic History     Marital status:      Spouse name: Not on file     Number of children: Not on file     Years of education: Not on file     Highest education level: Not on file   Occupational History     Not on file   Social Needs     Financial resource strain: Not on file     Food insecurity:     Worry: Not on file     Inability: Not on file      Transportation needs:     Medical: Not on file     Non-medical: Not on file   Tobacco Use     Smoking status: Never Smoker   Substance and Sexual Activity     Alcohol use: Not on file     Drug use: Not on file     Sexual activity: Not on file   Lifestyle     Physical activity:     Days per week: Not on file     Minutes per session: Not on file     Stress: Not on file   Relationships     Social connections:     Talks on phone: Not on file     Gets together: Not on file     Attends Congregational service: Not on file     Active member of club or organization: Not on file     Attends meetings of clubs or organizations: Not on file     Relationship status: Not on file     Intimate partner violence:     Fear of current or ex partner: Not on file     Emotionally abused: Not on file     Physically abused: Not on file     Forced sexual activity: Not on file   Other Topics Concern     Not on file   Social History Narrative     Not on file     Family History   Problem Relation Age of Onset     Coronary Artery Disease Mother 60        CAB twice, CHF     Kidney Disease Mother      Cerebrovascular Disease Father 88     Coronary Artery Disease Maternal Grandfather 60     Deep Vein Thrombosis Brother      Hyperlipidemia Brother      Coronary Artery Disease Brother         cardiac arrest survived  maker     Heart Disease Paternal Grandfather        PE:  Alert and Oriented, Answering Questions Appropriately  Atraumatic, Normocephalic, Face Symmetric  Skin: Novak 2  Facial Nerve Intact and facial movement symmetric  EOM's, PEERL  Dermatochalasis with excess skin touching the eyelids  Brow ptosis with brow resting below the superior orbital rim  Lids resting on eyelashes obstructing the temporal visual axis  Manual elevation of the brows leads to improved sight reported by the patient bilaterally.  Neuro/Psych: CN's 2-12 intact, Moves all extremities, ambulation in intact, positive affect, no notable muscle weakness       IMPRESSION:    Brow ptosis bilaterally  Dermatochalasis  Visual field obstruction      PLAN:    Jeanie Pavon has notable skin and soft tissue laxity in the periorbital area that warrants evaluation with visual field testing. She is quite symptomatic.    Referral to ophthalmology for comprehensive eye exam and visual field testing    She will need both brow lift and bilateral upper blephs and we gave some information about her condition today. We will meet in the future to discuss the specifics of surgery.    Photodocumentation was obtained.     I spent a total of 30 minutes face-to-face with Jeanie Pavon during today's office visit.  Over 50% of this time was spent counseling the patient and/or coordinating care regarding symptoms of visual obstruction.  See note for details.

## 2019-02-22 NOTE — LETTER
February 22, 2019  Re: Jeanie Pavon  1950    Dear Dr. Mills,    Thank you so much for referring Jeanie Pavon to the [facility]. I had the pleasure of visiting with Jeanie today.     Attached you will find a copy of my note. Please feel free to reach out to me with any questions, [phone].     Facial Plastic and Reconstructive Surgery Consultation    Referring Provider:   Referred Self, MD  No address on file    HPI:   I had the pleasure of seeing Jeanie Pavon today in clinic for consultation for visual obstruction in the periorbital area.    FUNCTIONAL COMPLAINTS RELATED TO DROOPY EYELIDS/BROWS:  Jeanie Pavon describes upper lids interfering with superior visual field and interfering with activities of daily living including reading, driving and watching television. She has to support her eyelids and brows in order to be able to read and finds that it leads to a significant improvement.     Denies dry eyes and does not use drops or ointment. States she can make tears but hasnt cried rené long time and believes this is due to emotional numbing from prozac.    PMH: recovering alcoholic/drug dependence, Meniere's disease   PSH: Surgery for meniere's disease 1987      Review Of Systems  ROS: 10 point ROS neg other than the symptoms noted above in the HPI.    Patient Active Problem List   Diagnosis     Familial hyperlipidemia     Depression     Fatigue     Hallux limitus, unspecified laterality     Past Surgical History:   Procedure Laterality Date     DECOMPRESS, ENHANCE ENDOLYMPHATIC SAC Right 1987     Current Outpatient Medications   Medication Sig Dispense Refill     aspirin 81 MG tablet Take 81 mg by mouth daily       FLUoxetine (PROZAC) 40 MG capsule Take 40 mg by mouth daily       valsartan (DIOVAN) 80 MG tablet Take 1 tablet (80 mg) by mouth daily (Patient not taking: Reported on 8/21/2017) 90 tablet 0     Codeine  Social History     Socioeconomic History     Marital status:      Spouse  name: Not on file     Number of children: Not on file     Years of education: Not on file     Highest education level: Not on file   Occupational History     Not on file   Social Needs     Financial resource strain: Not on file     Food insecurity:     Worry: Not on file     Inability: Not on file     Transportation needs:     Medical: Not on file     Non-medical: Not on file   Tobacco Use     Smoking status: Never Smoker   Substance and Sexual Activity     Alcohol use: Not on file     Drug use: Not on file     Sexual activity: Not on file   Lifestyle     Physical activity:     Days per week: Not on file     Minutes per session: Not on file     Stress: Not on file   Relationships     Social connections:     Talks on phone: Not on file     Gets together: Not on file     Attends [employer] service: Not on file     Active member of club or organization: Not on file     Attends meetings of clubs or organizations: Not on file     Relationship status: Not on file     Intimate partner violence:     Fear of current or ex partner: Not on file     Emotionally abused: Not on file     Physically abused: Not on file     Forced sexual activity: Not on file   Other Topics Concern     Not on file   Social History Narrative     Not on file     Family History   Problem Relation Age of Onset     Coronary Artery Disease Mother 60        CAB twice, CHF     Kidney Disease Mother      Cerebrovascular Disease Father 88     Coronary Artery Disease Maternal Grandfather 60     Deep Vein Thrombosis Brother      Hyperlipidemia Brother      Coronary Artery Disease Brother         cardiac arrest survived  maker     Heart Disease Paternal Grandfather        PE:  Alert and Oriented, Answering Questions Appropriately  Atraumatic, Normocephalic, Face Symmetric  Skin: Novak 2  Facial Nerve Intact and facial movement symmetric  EOM's, PEERL  Dermatochalasis with excess skin touching the eyelids  Brow ptosis with brow resting below the  superior orbital rim  Lids resting on eyelashes obstructing the temporal visual axis  Manual elevation of the brows leads to improved sight reported by the patient bilaterally.  Neuro/Psych: CN's 2-12 intact, Moves all extremities, ambulation in intact, positive affect, no notable muscle weakness      IMPRESSION:    Brow ptosis bilaterally  Dermatochalasis  Visual field obstruction      PLAN:    Jeanie Pavon has notable skin and soft tissue laxity in the periorbital area that warrants evaluation with visual field testing. She is quite symptomatic.    Referral to ophthalmology for comprehensive eye exam and visual field testing    She will need both brow lift and bilateral upper blephs and we gave some information about her condition today. We will meet in the future to discuss the specifics of surgery.    Photodocumentation was obtained.     I spent a total of 30 minutes face-to-face with Jeanie Pavon during today's office visit.  Over 50% of this time was spent counseling the patient and/or coordinating care regarding symptoms of visual obstruction.  See note for details.             Your trust in our practice and care is much appreciated.    Sincerely,  Shari Pizarro MD

## 2019-02-22 NOTE — NURSING NOTE
Photodocumentation obtained - see media tab.      Referral placed to Opthomology for Eye Exam and Visual Field Testing.    Patient to follow up with Dr. Shanta Cardoso after her eye appt to further discuss surgery.      Gave patient my card.    Pooja Milton RN  2/22/2019 11:21 AM

## 2019-03-01 ENCOUNTER — TELEPHONE (OUTPATIENT)
Dept: OTOLARYNGOLOGY | Facility: CLINIC | Age: 69
End: 2019-03-01

## 2019-03-04 ENCOUNTER — DOCUMENTATION ONLY (OUTPATIENT)
Dept: CARE COORDINATION | Facility: CLINIC | Age: 69
End: 2019-03-04

## 2019-03-06 ENCOUNTER — ALLIED HEALTH/NURSE VISIT (OUTPATIENT)
Dept: OPHTHALMOLOGY | Facility: CLINIC | Age: 69
End: 2019-03-06
Payer: COMMERCIAL

## 2019-03-06 ENCOUNTER — OFFICE VISIT (OUTPATIENT)
Dept: OPHTHALMOLOGY | Facility: CLINIC | Age: 69
End: 2019-03-06
Payer: COMMERCIAL

## 2019-03-06 DIAGNOSIS — H02.834 DERMATOCHALASIS OF BOTH UPPER EYELIDS: Primary | ICD-10-CM

## 2019-03-06 DIAGNOSIS — H02.831 DERMATOCHALASIS OF BOTH UPPER EYELIDS: Primary | ICD-10-CM

## 2019-03-06 DIAGNOSIS — H25.13 SENILE NUCLEAR SCLEROSIS, BILATERAL: ICD-10-CM

## 2019-03-06 DIAGNOSIS — H52.4 PRESBYOPIA: ICD-10-CM

## 2019-03-06 RX ORDER — BUPROPION HYDROCHLORIDE 150 MG/1
TABLET ORAL
Refills: 1 | COMMUNITY
Start: 2019-02-21

## 2019-03-06 RX ORDER — TRIAMTERENE AND HYDROCHLOROTHIAZIDE 37.5; 25 MG/1; MG/1
1 CAPSULE ORAL
COMMUNITY
Start: 2018-04-25 | End: 2022-06-30 | Stop reason: DRUGHIGH

## 2019-03-06 ASSESSMENT — REFRACTION_MANIFEST
OD_AXIS: 030
OD_SPHERE: +0.25
OS_AXIS: 150
OD_ADD: +2.50
OD_AXIS: 020
OS_SPHERE: -0.25
OD_CYLINDER: +0.25
OS_CYLINDER: +0.50
OS_SPHERE: -0.75
OS_AXIS: 150
OD_SPHERE: -0.75
OD_CYLINDER: +0.75
OS_CYLINDER: +0.75
OS_ADD: +2.50

## 2019-03-06 ASSESSMENT — CONF VISUAL FIELD
OS_NORMAL: 1
METHOD: COUNTING FINGERS
OD_NORMAL: 1

## 2019-03-06 ASSESSMENT — CUP TO DISC RATIO
OS_RATIO: 0.3
OD_RATIO: 0.3

## 2019-03-06 ASSESSMENT — TONOMETRY
IOP_METHOD: ICARE
OS_IOP_MMHG: 18
OD_IOP_MMHG: 18

## 2019-03-06 ASSESSMENT — VISUAL ACUITY
METHOD: SNELLEN - LINEAR
OS_SC+: -3
OD_SC: 20/20
OS_SC: 20/25

## 2019-03-06 ASSESSMENT — EXTERNAL EXAM - LEFT EYE: OS_EXAM: NORMAL

## 2019-03-06 ASSESSMENT — EXTERNAL EXAM - RIGHT EYE: OD_EXAM: NORMAL

## 2019-03-06 NOTE — NURSING NOTE
Chief Complaints and History of Present Illnesses   Patient presents with     COMPREHENSIVE EYE EXAM     Chief Complaint(s) and History of Present Illness(es)     COMPREHENSIVE EYE EXAM     Laterality: both eyes    Associated symptoms: floaters (longstanding floaters that have remained unchanged per pt).  Negative for flashes, dryness, tearing and eye pain              Comments     Patient notes she has a h/o ocular migraines, not bothersome, no other types of migraines.     Audrey Elliott March 6, 2019 10:43 AM

## 2019-03-06 NOTE — LETTER
Date:March 9, 2019      Patient was self referred, no letter generated. Do not send.        [facility] Information

## 2019-03-06 NOTE — PROGRESS NOTES
Assessment/Plan  (H02.831,  H02.834) Dermatochalasis of both upper eyelids  (primary encounter diagnosis)  Comment: Patient referred by Dr. Pizarro for complete exam. Patient being considered for blepharoplasty  Plan: Discussed findings with patient. Patient to continue care with Dr. Pizarro in preparation for surgery, visual field test sent to that provider for interpretation and consideration of surgery.     (H25.13) Senile nuclear sclerosis, bilateral  Comment: Not at surgical level.   Plan: Monitor annually for now. RTC with any vision changes, particularly new flashes/floaters/curtain over vision.     (H52.4) Presbyopia  Plan: REFRACTION [70530]        SRx updated and released. Monitor regularly for changes.       Complete documentation of historical and exam elements from today's encounter can  be found in the full encounter summary report (not reduplicated in this progress  note). I personally obtained the chief complaint(s) and history of present illness. I  confirmed and edited as necessary the review of systems, past medical/surgical  history, family history, social history, and examination findings as documented by  others; and I examined the patient myself. I personally reviewed the relevant tests,  images, and reports as documented above. I formulated and edited as necessary the  assessment and plan and discussed the findings and management plan with the  patient and family.    Ramone Caal OD

## 2019-09-29 ENCOUNTER — HEALTH MAINTENANCE LETTER (OUTPATIENT)
Age: 69
End: 2019-09-29

## 2019-12-09 ENCOUNTER — HOSPITAL ENCOUNTER (EMERGENCY)
Facility: CLINIC | Age: 69
Discharge: HOME | End: 2019-12-09
Attending: EMERGENCY MEDICINE | Admitting: EMERGENCY MEDICINE
Payer: COMMERCIAL

## 2019-12-09 VITALS
HEART RATE: 96 BPM | RESPIRATION RATE: 18 BRPM | SYSTOLIC BLOOD PRESSURE: 132 MMHG | DIASTOLIC BLOOD PRESSURE: 96 MMHG | OXYGEN SATURATION: 99 % | TEMPERATURE: 97.5 F

## 2019-12-09 DIAGNOSIS — R00.2 PALPITATIONS: ICD-10-CM

## 2019-12-09 DIAGNOSIS — H57.89 EYE REDNESS: ICD-10-CM

## 2019-12-09 PROCEDURE — 93005 ELECTROCARDIOGRAM TRACING: CPT

## 2019-12-09 PROCEDURE — 99283 EMERGENCY DEPT VISIT LOW MDM: CPT

## 2019-12-09 RX ORDER — TETRACAINE HYDROCHLORIDE 5 MG/ML
2 SOLUTION OPHTHALMIC ONCE
Status: DISCONTINUED | OUTPATIENT
Start: 2019-12-09 | End: 2019-12-10 | Stop reason: HOSPADM

## 2019-12-09 RX ORDER — POLYMYXIN B SULFATE AND TRIMETHOPRIM 1; 10000 MG/ML; [USP'U]/ML
1 SOLUTION OPHTHALMIC
Qty: 3 ML | Refills: 0 | Status: SHIPPED | OUTPATIENT
Start: 2019-12-09 | End: 2019-12-16

## 2019-12-09 ASSESSMENT — ENCOUNTER SYMPTOMS
PALPITATIONS: 1
EYE ITCHING: 1
EYE PAIN: 1
SHORTNESS OF BREATH: 0

## 2019-12-09 NOTE — ED AVS SNAPSHOT
[facility] Emergency Department  [address]  [facility] [address]  Phone:  [phone]  Fax:  [phone]                                    Jeanie Pavon   MRN: [id number]    Department:  [facility] Emergency Department   Date of Visit:  12/9/2019           After Visit Summary Signature Page    I have received my discharge instructions, and my questions have been answered. I have discussed any challenges I see with this plan with the nurse or doctor.    ..........................................................................................................................................  Patient/Patient Representative Signature      ..........................................................................................................................................  Patient Representative Print Name and Relationship to Patient    ..................................................               ................................................  Date                                   Time    ..........................................................................................................................................  Reviewed by Signature/Title    ...................................................              ..............................................  Date                                               Time          22EPIC Rev 08/18

## 2019-12-10 LAB — INTERPRETATION ECG - MUSE: NORMAL

## 2019-12-10 NOTE — DISCHARGE INSTRUCTIONS
See ophthalmologist tomorrow if still swollen and itchy  Start the antibiotic eye drops  Can try an oral antihistamine like claritin, benadryl before bed

## 2019-12-10 NOTE — ED PROVIDER NOTES
History     Chief Complaint:    Eye Pain     The history is provided by the patient.      Jeanie Pavon is a 69 year old female with a history of hyperlipidemia, Meniere's disease, and Factor V Leiden mutation who presents with left eye pain that began 2 hours ago. The patient's eyes felt irritated earlier today and she used eye drops before her left eye became more painful. The eye felt more itchy on the inside and is swollen now. She denies wearing glasses or contacts. She denies any vision issues. In addition, the patient experienced heart palpations, but they resolved after a few minutes. She was not concerned about them and was evaluated for palpitations at a heart clinic 2 years ago. The patient denies any chest pain or shortness of breath.    Allergies:  Alcohol  Codeine     Medications:    Wellbutrin XL  Prozac  Dyazide    Past Medical History:    Dermatochalasis of both upper eyelids  Brow ptosis  Hallux limitus, unspecified laterality  Hyperlipidemia  Depression  History of alcohol abuse, in remission  Meniere disease  Homozygous Factor V Leiden mutation  Vitamin D deficiency  Atrophy of vulva  Mixed, or nondependent drug abuse, in remission  Hearing loss, right  PAC    Past Surgical History:    Decompress, enhance endolymphatic sac  Tuboplasty or vasoplasty after previous sterilization  Tubal ligation  Inner ear surgery x 2    Family History:    Coronary artery disease - mother  CHF - mother  Kidney disease - mother  Macular degeneration - mother  Cerebrovascular disease - father  DVT - brother  Hyperlipidemia - brother  Coronary artery disease - brother  Vocal cord cancer - father  Throat cancer - father  Hyperlipidemia - mother  Hypertension - mother  Thyroid disease - mother  Crohn's disease - brother  Inflammatory bowel disease - brother  Factor V Leiden - brothers x 3  Sarah' Danlos syndrome - daughter  Pancreatic cancer - sister  Factor V Leiden - sister  Inflammatory bowel disease -  son    Social History:  Negative for tobacco use - former smoker.  Negative for alcohol use.  Negative for drug use.  Patient accompanied to the ER by her .  Marital Status:   [2]     Review of Systems   Eyes: Positive for pain (left) and itching (left). Negative for visual disturbance.        Left eye swollen   Respiratory: Negative for shortness of breath.    Cardiovascular: Positive for palpitations. Negative for chest pain.   All other systems reviewed and are negative.    Physical Exam     Patient Vitals for the past 24 hrs:   BP Temp Temp src Pulse Heart Rate Resp SpO2   12/09/19 2326 (!) 132/96 -- -- -- 86 -- 99 %   12/09/19 2140 (!) 167/103 97.5  F (36.4  C) Oral 96 96 18 99 %     Physical Exam    General: Resting comfortably on the gurney  Eyes:  The pupils are equal and round; reactive to light bilaterally. No afferent pupillary defect.     Mild conjunctival injection with scleral edema/swelling    EOMI    IOP R 15, 18. IOP L 15, 16.    Slit lamp: No cells or flare. No foreign body visualized. No fluoroscein uptake on left eye. No ulcer or dendrites. No evidence of open globe  ENT:    Moist mucous membranes; no swelling or erythema of eyelids  Neck:  Normal range of motion  CV:  Regular rate and rhythm    Skin warm and well perfused   Resp:  Non labored breathing on room air  MS:  Normal muscular tone  Skin:  No rash or acute skin lesions noted  Neuro:   Awake, alert.      Speech is normal and fluent.    Face is symmetric.     Moves all extremities equally  Psych:  Normal affect.  Appropriate interactions.    Emergency Department Course     ECG (22:07:38):  Rate 82 bpm. KY interval 182. QRS duration 88. QT/QTc 374/436. P-R-T axes 43 8 48. Normal sinus rhythm. Normal ECG. No significant change compared to EKG dated 4/24/17. Interpreted at 2209 by Barbi Gill MD.    Interventions:  2226: Ful-roshni 1 strip left eye  2226: Pontocaine 2 drop left eye    Emergency Department Course:  Past  medical records, nursing notes, and vitals reviewed.    2221: I performed an exam of the patient as documented above.     2259: I performed an eye exam of the patient.    EKG obtained in the ED, see results above.      I rechecked the patient and discussed the results of her workup thus far.     I personally reviewed the EKG results with the Patient and spouse and answered all related questions prior to discharge.     Findings and plan explained to the Patient and spouse. Patient discharged home with instructions regarding supportive care, medications, and reasons to return. The importance of close follow-up was reviewed.     Impression & Plan     Medical Decision Making:  Jeanie Pavon is a 69 year old female who presents for evaluation of eye pain.  A broad differential diagnosis was considered including bacterial conjunctivitis, viral conjunctivitis, foreign body, corneal abrasion, chemical vs allergic conjunctivitis, corneal ulcer, HSV, herpes zoster ophthalmicus, endophthalmitis, orbital cellulitis, etc.  No concerning etiology found today. Looks more like edema, possible allergic phenomenon. Will start antibiotics and recommended f/u with ophthalmologist tomorrow if not improved.  No red flag symptoms to suggest any of the above worrisome etiologies. No dendrite or ulcer seen on slit lamp exam. Patient also had palpitations. Pt mentioned this in passing and is not worried about this and does not want any additional workup for this done today. EKG with normal sinus rhythm with no arythmias or acute ischemic changes.    Discharge Diagnosis:    ICD-10-CM   1. Eye redness H57.89   2. Palpitations R00.2     Disposition:  Discharged to home.    Discharge Medications:  Discharge Medication List as of 12/9/2019 11:21 PM   START taking these medications    Details   trimethoprim-polymyxin b (POLYTRIM) 36199-0.1 UNIT/ML-% ophthalmic solution Place 1 drop Into the left eye every 3 hours for 7 days While awake, Disp-3 mL,  R-0, Local Print     Scribe Disclosure:  I, Eliza Coulter, am serving as a scribe at 10:22 PM on 12/9/2019 to document services personally performed by Barbi Gill MD based on my observations and the provider's statements to me.     Eliza Coulter  12/9/2019   [facility] EMERGENCY DEPARTMENT       Barbi Gill MD  12/10/19 0151

## 2019-12-10 NOTE — ED TRIAGE NOTES
Here swelling to left eye and itchiness since today. Denies any vision changes. Also c/o palpitations started tonight while watching tv, lasting about 5 minutes, currently resolve. Denies any chest pain or sob.

## 2020-03-15 ENCOUNTER — HEALTH MAINTENANCE LETTER (OUTPATIENT)
Age: 70
End: 2020-03-15

## 2021-01-14 ENCOUNTER — HEALTH MAINTENANCE LETTER (OUTPATIENT)
Age: 71
End: 2021-01-14

## 2021-05-09 ENCOUNTER — HEALTH MAINTENANCE LETTER (OUTPATIENT)
Age: 71
End: 2021-05-09

## 2021-10-24 ENCOUNTER — HEALTH MAINTENANCE LETTER (OUTPATIENT)
Age: 71
End: 2021-10-24

## 2022-05-10 ENCOUNTER — OFFICE VISIT (OUTPATIENT)
Dept: DERMATOLOGY | Facility: CLINIC | Age: 72
End: 2022-05-10
Payer: COMMERCIAL

## 2022-05-10 DIAGNOSIS — Z00.6 RESEARCH SUBJECT: Primary | ICD-10-CM

## 2022-05-10 PROCEDURE — 11104 PUNCH BX SKIN SINGLE LESION: CPT | Mod: GC

## 2022-05-10 PROCEDURE — 11105 PUNCH BX SKIN EA SEP/ADDL: CPT | Mod: GC

## 2022-05-10 NOTE — PROGRESS NOTES
[facility]  Nucleotide Excision Repair (NER) In Human Populations Study   May 10, 2022    Study Participant Name: Jeanie Pavon   : 1950    Study Participant: # [id number]    Study Procedures Performed:  1. Patient consented and consent form (English) signed. Patient provided copy of consent form.   2. Patient provided $75 [employer] as compensation for study participation; W-9 form completed.   3. NER questionnaire completed  4. Two 2 mm punch biopsies performed (see procedure note below)  5. Blood draw performed    Biopsy - Chronically Sun-Exposed Site (A): L dorsal hand  Punch biopsy:  After discussion of benefits and risks including but not limited to bleeding/bruising, pain/swelling, infection, scar, incomplete removal, nerve damage/numbness, recurrence, and non-diagnostic biopsy, written consent, verbal consent and photographs were obtained. Time-out was performed. The area was cleaned with isopropyl alcohol. 0.5mL of 1% lidocaine with epinephrine was injected to obtain adequate anesthesia of the lesion. A 2 mm punch biopsy was performed.  5-0 fast absorbing gut sutures were utilized to approximate the epidermal edges.  White petroleum jelly/VaselineTM and a bandage was applied to the wound.  Explicit verbal and written wound care instructions were provided.  The patient left the Dermatology Clinic in good condition.     Biopsy - Unexposed Site (B): L proximal inner arm  Punch biopsy:  After discussion of benefits and risks including but not limited to bleeding/bruising, pain/swelling, infection, scar, incomplete removal, nerve damage/numbness, recurrence, and non-diagnostic biopsy, written consent, verbal consent and photographs were obtained. Time-out was performed. The area was cleaned with isopropyl alcohol. 0.5mL of 1% lidocaine with epinephrine was injected to obtain adequate anesthesia of the lesion. A 2 mm punch biopsy was performed.  5-0 fast absorbing gut sutures were utilized to  approximate the epidermal edges.  White petroleum jelly/VaselineTM and a bandage was applied to the wound.  Explicit verbal and written wound care instructions were provided.  The patient left the Dermatology Clinic in good condition.    Staff Involved:  Meir Willis MD - Research Fellow  Km Fatima MD -     Km Fatima MD  Pronouns: he/him/his    Department of Dermatology  [facility]: Phone: [phone], Fax:[phone]  [facility]: Phone: [phone] Fax: [phone]

## 2022-05-10 NOTE — PATIENT INSTRUCTIONS
Wound Care After a Biopsy    What is a skin biopsy?  A skin biopsy allows the doctor to examine a very small piece of tissue under the microscope to determine the diagnosis and the best treatment for the skin condition. A local anesthetic (numbing medicine)  is injected with a very small needle into the skin area to be tested. A small piece of skin is taken from the area. Sometimes a suture (stitch) is used.     What are the risks of a skin biopsy?  I will experience scar, bleeding, swelling, pain, crusting and redness. I may experience incomplete removal or recurrence. Risks of this procedure are excessive bleeding, bruising, infection, nerve damage, numbness, thick (hypertrophic or keloidal) scar and non-diagnostic biopsy.    How should I care for my wound for the first 24 hours?  Keep the wound dry and covered for 24 hours  If it bleeds, hold direct pressure on the area for 15 minutes. If bleeding does not stop then go to the emergency room  Avoid strenuous exercise the first 1-2 days or as your doctor instructs you    How should I care for the wound after 24 hours?  After 24 hours, remove the bandage  You may bathe or shower as normal  If you had a scalp biopsy, you can shampoo as usual and can use shower water to clean the biopsy site daily  Clean the wound twice a day with gentle soap and water  Do not scrub, be gentle  Apply white petroleum/Vaseline after cleaning the wound with a cotton swab or a clean finger, and keep the site covered with a Bandaid /bandage. Bandages are not necessary with a scalp biopsy  If you are unable to cover the site with a Bandaid /bandage, re-apply ointment 2-3 times a day to keep the site moist. Moisture will help with healing  Avoid strenuous activity for first 1-2 days  Avoid lakes, rivers, pools, and oceans until the stitches are removed or the site is healed    How do I clean my wound?  Wash hands thoroughly with soap or use hand  before all wound care  Clean the  wound with gentle soap and water  Apply white petroleum/Vaseline  to wound after it is clean  Replace the Bandaid /bandage to keep the wound covered for the first few days or as instructed by your doctor  If you had a scalp biopsy, warm shower water to the area on a daily basis should suffice    What should I use to clean my wound?   Cotton-tipped applicators (Qtips )  White petroleum jelly (Vaseline ). Use a clean new container and use Q-tips to apply.  Bandaids   as needed  Gentle soap     How should I care for my wound long term?  Do not get your wound dirty  Keep up with wound care for one week or until the area is healed.  You should have some soreness but it should be mild and slowly go away over several days. Talk to your doctor about using tylenol for pain,    When should I call my doctor?  If you have increased:   Pain or swelling  Pus or drainage (clear or slightly yellow drainage is ok)  Temperature over 100F  Spreading redness or warmth around wound    When will I hear about my results?  The biopsy results can take 2 weeks to come back.  Your results will automatically release to [employer] before your provider has even reviewed them.  The clinic will call you with the results, send you a [employer] message, or have you schedule a follow-up clinic or phone time to discuss the results.  Contact our clinics if you do not hear from us in 2 weeks.    Who should I call with questions?  [facility]: [phone]  [facility]: [phone]  For urgent needs outside of business hours call the [facility] at [phone] and ask for the dermatology resident on call

## 2022-06-05 ENCOUNTER — HEALTH MAINTENANCE LETTER (OUTPATIENT)
Age: 72
End: 2022-06-05

## 2022-06-23 DIAGNOSIS — E78.49 FAMILIAL HYPERLIPIDEMIA: Primary | ICD-10-CM

## 2022-06-30 ENCOUNTER — OFFICE VISIT (OUTPATIENT)
Dept: CARDIOLOGY | Facility: CLINIC | Age: 72
End: 2022-06-30
Payer: COMMERCIAL

## 2022-06-30 ENCOUNTER — LAB (OUTPATIENT)
Dept: LAB | Facility: CLINIC | Age: 72
End: 2022-06-30

## 2022-06-30 VITALS
BODY MASS INDEX: 30.46 KG/M2 | HEART RATE: 68 BPM | HEIGHT: 64 IN | DIASTOLIC BLOOD PRESSURE: 77 MMHG | OXYGEN SATURATION: 97 % | SYSTOLIC BLOOD PRESSURE: 116 MMHG | WEIGHT: 178.4 LBS

## 2022-06-30 DIAGNOSIS — E78.49 FAMILIAL HYPERLIPIDEMIA: ICD-10-CM

## 2022-06-30 DIAGNOSIS — E78.49 FAMILIAL HYPERLIPIDEMIA: Primary | ICD-10-CM

## 2022-06-30 DIAGNOSIS — R09.89 OTHER SPECIFIED SYMPTOMS AND SIGNS INVOLVING THE CIRCULATORY AND RESPIRATORY SYSTEMS: ICD-10-CM

## 2022-06-30 DIAGNOSIS — I10 BENIGN ESSENTIAL HYPERTENSION: ICD-10-CM

## 2022-06-30 LAB
CHOLEST SERPL-MCNC: 280 MG/DL
CREAT UR-MCNC: 184 MG/DL
CRP SERPL HS-MCNC: 7.8 MG/L
FASTING STATUS PATIENT QL REPORTED: YES
FASTING STATUS PATIENT QL REPORTED: YES
GLUCOSE BLD-MCNC: 99 MG/DL (ref 70–99)
HDLC SERPL-MCNC: 54 MG/DL
LDLC SERPL CALC-MCNC: 187 MG/DL
MICROALBUMIN UR-MCNC: 15 MG/L
MICROALBUMIN/CREAT UR: 8.15 MG/G CR (ref 0–25)
NONHDLC SERPL-MCNC: 226 MG/DL
TRIGL SERPL-MCNC: 195 MG/DL

## 2022-06-30 PROCEDURE — 99215 OFFICE O/P EST HI 40 MIN: CPT | Mod: 25 | Performed by: NURSE PRACTITIONER

## 2022-06-30 PROCEDURE — 80061 LIPID PANEL: CPT | Performed by: PATHOLOGY

## 2022-06-30 PROCEDURE — 82947 ASSAY GLUCOSE BLOOD QUANT: CPT | Performed by: PATHOLOGY

## 2022-06-30 PROCEDURE — 86141 C-REACTIVE PROTEIN HS: CPT | Performed by: NURSE PRACTITIONER

## 2022-06-30 PROCEDURE — 82043 UR ALBUMIN QUANTITATIVE: CPT | Performed by: PATHOLOGY

## 2022-06-30 PROCEDURE — 93922 UPR/L XTREMITY ART 2 LEVELS: CPT | Performed by: NURSE PRACTITIONER

## 2022-06-30 PROCEDURE — 36415 COLL VENOUS BLD VENIPUNCTURE: CPT | Performed by: PATHOLOGY

## 2022-06-30 RX ORDER — HYDROCHLOROTHIAZIDE 12.5 MG/1
TABLET ORAL
COMMUNITY
Start: 2022-06-08

## 2022-06-30 NOTE — LETTER
6/30/2022      RE: Jeanie Pavon  [address]  [place] [address]       Dear Colleague,    Thank you for the opportunity to participate in the care of your patient, Jeanie Pavon, at the [facility] [place] at [facility]. Please see a copy of my visit note below.      [facility] Cardiovascular Disease Prevention - Exam Note    Active Problems   Patient Active Problem List    Diagnosis Date Noted     Dermatochalasis of both upper eyelids 02/22/2019     Priority: Medium     Brow ptosis 02/22/2019     Priority: Medium     Hallux limitus, unspecified laterality 08/21/2017     Priority: Medium     Familial hyperlipidemia      Priority: Medium     pt reports onset two years ago but labs from 2005 elevated LDL       Depression      Priority: Medium     Fatigue      Priority: Medium       Reason For Visit   Patient here for [facility] early detection of atherosclerosis and CVD exam.    Pain Evaluation  Current history of pain associated with this visit is: denied    Cardiac risk factors:  + age     + smoking (ex)   + elevated BMI     + Family history CVD    - Diet   + Hypertension    HPI   Jeanie Pavon is a 72 year old year old female with a family history of heart disease, hyperlipidemia, hypertension, meniere disease,, depression, and alcohol abuse.  CAC score of 27, 50-75th percentile 12/2017.  She was seen in the [facility] CV Prevention clinic in 2017, score 9. It was recommended that she start a statin medication but she has not wanted to do that. Her PCP has also recommended that she start a cholesterol medication.  Her primary care provider is LENARD Hernandez at [facility] in [place]. She is retired, she worked in high schools on art projects.  She has palpitation 3x/week that last for seconds. Today in clinic she denies chest pain at rest, with activity, while  sleeping, SOB at rest, with activity or while sleeping, palpitations, lightheadedness, lower leg edema, calf cramps, indigestion, headaches or issues with her memory.     Nutrition assessment per patient report:   Foods with fat/cholesterol (fried foods, fatty meats, junk food):  1x/month  Fruits and vegetables (  cup cooked, 1 cup raw):  1-3 servings of vegetables/day, fruit 1-3 servings/day  Caffeine (1 cup coffee, soda, etc):  1 cup coffee/day  Alcohol servings (12 oz. beer, 4 oz. wine, 1  oz. in mixed drink):  none X 33 years  Special dietary habits:  salt  Typical breakfast:  Peanut butter sandwich                 Lunch: pasta, fruit and vegetables                 Dinner: protein and vegetable                 Snacks: cheese, fruit                 Drinks:  water  Activity  Patient is not active, she used to have an exercise routine but does not currently    Sleep pattern:    Laboratory Results Review  We discussed laboratory results today including lipids targets and how foods influence cholesterol.    Weight  Her perceived healthy weight is 153 pounds.  A normal BMI of 25 is equal to 148 pounds.  The current BMI of 31.1 is overweight range.  A weight reduction speed of 1-2 lbs per month for women is recommended.    PMH   Past Medical History:   Diagnosis Date     Agatston coronary artery calcium score less than 100 2017    score of 27, 50-75% range     Benign essential hypertension      Depression      Familial hyperlipidemia     pt reports onset two years ago but labs from 2005 elevated LDL     Fatigue      History of alcohol abuse     no alcohol for 20 years     Meniere disease 1987    coil in right inner ear, endolymphatic shunt       PSH  Past Surgical History:   Procedure Laterality Date     DECOMPRESS, ENHANCE ENDOLYMPHATIC SAC Right 1987       Current Meds   Current Outpatient Medications   Medication Sig Dispense Refill     buPROPion (WELLBUTRIN XL) 150 MG 24 hr tablet   1     cholecalciferol 25 MCG  (1000 UT) TABS Take 1,000 Units by mouth       FLUoxetine (PROZAC) 20 MG capsule TAKE THREE CAPSULES BY MOUTH DAILY       hydrochlorothiazide (HYDRODIURIL) 12.5 MG tablet Take 1 Tablet (12.5 mg) by mouth daily.         Allergies      Allergies   Allergen Reactions     Alcohol Other (See Comments)     Sober 28 years     Codeine Anxiety and Palpitations       Family Hx   Family History   Problem Relation Age of Onset     Coronary Artery Disease Mother 60        s/pMI at age 60, s/p CABG X 2, CHF     Kidney Disease Mother         cause of death     Macular Degeneration Mother      Cerebrovascular Disease Father 88     Pancreatic Cancer Sister         cause of death     Deep Vein Thrombosis Sister      Coronary Artery Disease Brother 77        s/p CABG at age 77     Hypertension Brother      Deep Vein Thrombosis Brother      Hypertension Brother      Hypertension Brother      Crohn's Disease Brother      Hypertension Brother      Hyperlipidemia Brother      Hyperlipidemia Brother      Coronary Artery Disease Brother 62        s/p MI, s/p cardiac arrest (smoker)     Coronary Artery Disease Maternal Grandfather 60     Heart Disease Paternal Grandfather      Ankylosing Spondylitis Son      Sarah-Danlos syndrome Daughter      Glaucoma No family hx of        Social History  She is retired.   She is  with 2 children    Tobacco History  History   Smoking Status     Former Smoker     Packs/day: 2.00     Years: 20.00     Types: Cigarettes     Start date: 1967     Quit date: 1987   Smokeless Tobacco     Never Used       ROS  CONSTITUTIONAL:  No fever, chills, or sweats. No weight gain/loss.   EENT:  No visual disturbance, ear ache, epistaxis, sore throat  ALLERGIES/IMMUNOLOGIC:  Negative  RESPIRATORY:  No cough, hemoptysis  CARDIOVASCULAR:  As per HPI  GI:  No nausea, vomiting, hematemesis, melena  :  No urinary frequency, dysuria, or hematuria  INTEGUMENT:  Negative  PSYCHIATRIC:  Negative  NEURO:  Negative  ENDOCRINE:  " Negative  MUSCULOSKELETAL:  Negative     Vital Signs   /77 (BP Location: Right arm, Patient Position: Sitting, Cuff Size: Adult Regular)   Pulse 68   Ht 1.613 m (5' 3.5\")   Wt 80.9 kg (178 lb 6.4 oz)   SpO2 97%   BMI 31.11 kg/m        Waist: 38.5 inches  Hip: 43.5 inches    Physical Exam   In general, the patient is a pleasant female in no apparent distress   HEENT: NC/AT, PERRLA, EOMI, sclerae white, not injected. Nares clear, pharynx without erythema or exudate, dentition intact    Neck: No adenopathy, no thyromegaly, carotids +4/4 bilaterally without bruits,  no jugular venous distension   Lungs: Breath sounds clear bilaterally, without crackles, ronchi, or wheezes  Cor: RRR, S1S2 without murmur, rub, click, or gallop, the PMI is in the 5th ICS in the midclavicular line  Abdomen: Soft, nontender, nondistended, BS+ in all 4 quadrants, without hepatomegaly, no aorta or renal artery bruits  Extremities: No clubbing, cyanosis, or edema. DP and PT pulses +2/4 bilaterally    The 10-year ASCVD risk score (Padmaja MARIFER Jr., et al., 2013) is: 25.3%  Values used to calculate the score:   Age: 72 year old   Sex: female   Is Non- : No   Diabetic: No   Tobacco smoker: No   Systolic Blood Press: 129 mmHg   Is BP treated: Yes   HDL Cholesterol: 54 mg/dL   Total Cholesterol: 280 mg/dL    Recent Labs  Lab Results   Component Value Date    GLC 99 06/30/2022    GLC 86 04/24/2017      Lab Results   Component Value Date    NTBNP 182 (H) 04/24/2017     No results found for: NTBNPI   Lab Results   Component Value Date    UCRR 184 06/30/2022    UCRR 83 04/24/2017      Lab Results   Component Value Date    MICROL 15 06/30/2022    MICROL 6 04/24/2017      No results found for: MICROALBUMIN   Lab Results   Component Value Date    CRP 7.80 06/30/2022    CRP 4.5 04/24/2017      Lab Results   Component Value Date    CHOL 280 (H) 06/30/2022    CHOL 288 (H) 04/24/2017      Lab Results   Component Value Date    " TRIG 195 (H) 06/30/2022    TRIG 207 (H) 04/24/2017      Lab Results   Component Value Date    HDL 54 06/30/2022    HDL 55 04/24/2017      Lab Results   Component Value Date     (H) 06/30/2022     (H) 04/24/2017      No results found for: VLDL   No results found for: CHOLHDLRATIO  Lab Results   Component Value Date    NHDL 226 (H) 06/30/2022    NHDL 233 (H) 04/24/2017        Assessment:    Cardiovascular:  Asymptomatic, she is not complaining of chest pain, CAC in 2016, recommend repeating CAC      Blood Pressure:  She takes BP medication, hydrochlorothiazide 12.5 mg, -129/77-85 mmHg    Lipids:  She does not take a statin medication, recommend starting 10 mg of atorvastatin. She would like to start with a very low dose, 10 mg every other day X 2-4 weeks and slowly advance.  Recheck FLP 10-12 weeks after she is taking 10 mg every day.   !LIPID/HEPATIC Latest Ref Rng & Units 4/24/2017 6/30/2022   CHOL <200 mg/dL 288 (H) 280 (H)   TRIGLYCERIDES <150 mg/dL 207 (H) 195 (H)   HDL >=50 mg/dL 55 54   LDL <=100 mg/dL 192 (H) 187 (H)   NHDL <130 mg/dL 233 (H) 226 (H)   CRP mg/L 4.5        Glucose: 99    Sleep pattern:  Sleep hygiene reviewed during clinic visit, handout given to patient    Weight Management: BMI 31.1, refer to comprehensive weight management    Return to Clinic: 5 years    Health Habit Summary:  Nutrition: Heart Healthy Eating:  most of the time   Exercise:  not regularly active  Weight:  high-risk range  Tobacco Use:  ex-smoker    Full report to follow prevention team review of test results with scanned final report.    Time spent for patient visit was 60 minutes with more than half the time spent on counseling and coordination of care.    MIREYA Ellison CNP       CC  Patient Care Team:  Lexy Escamilla as PCP - General (Physician Assistant)  Miryam Rai APRN CNP as Nurse Practitioner (Nurse Practitioner)  Devi Gallegos APRN CNP as Nurse Practitioner (Cardiovascular  Disease)  AB HONG Test Results    WALKING BLOOD PRESSURE RESPONSE (3 minute, 5 MET level walk)   Pre BP: 122/80 mmHg  3 min BP: 148/72 mmHg  1 min post BP: 150/80 mmHg    Pre HR: 78 bpm  3 min HR: 140 bpm  1 min post HR: 80 bpm     Test results: Walking blood pressure response to 3 minutes activity is in normal range.     ABDOMINAL AORTA ULTRASOUND (< 2.5 normal, borderline 2.5-2.9, abnormal > 3)   SupraIliac 1.7 cm    SupraRenal 1.7 cm    InfraRenal Proximal 2.0 cm    InfraRenal Distal 1.9 cm      Abdominal Aorta Assessment:  normal    LEFT VENTRICULAR ULTRASOUND MEASUREMENTS (adjusted for BSA)  LVIDD 49.10 mm   Septa 8.4 mm   Posterior 8.8 mm     Left Ventricular US Assessment:  normal    Carotid Artery IMT measurements report and plaques in the small area examined:   Left IMT 1.041 mm  Plaques small heterogeneous plaque in left bulb area size 1.3 mm     Right IMT 0.642 mm  Plaques  small heterogeneous plaque in right bulb area size 2.7 mm.      Test results: carotid artery wall thickening is in abnormal range with plaque formations.     ECG (see tracing):  normal sinus rhythm;  rate: 68 bpm    Arterial Elasticity per age and gender (see printout):   C1 11.5 mL/mmHg x 10  normal   C2 2.3 mL/mmHg x 100 abnormal   Supine blood pressure: 139/83 mmHg     Test results: arterial elasticity of the large size arteries is in normal range after adjusting for age and gender. Arterial elasticity of the small size arteries is in abnormal range after adjusting for age and gender.       Alis Toure      Please do not hesitate to contact me if you have any questions/concerns.     Sincerely,     MIREYA Ellison CNP

## 2022-06-30 NOTE — LETTER
6/30/2022      RE: Jeanie Pavon  [id number]  [facility] [id number]         [facility] for Cardiovascular Disease Prevention - Exam Note    Active Problems   Patient Active Problem List    Diagnosis Date Noted     Dermatochalasis of both upper eyelids 02/22/2019     Priority: Medium     Brow ptosis 02/22/2019     Priority: Medium     Hallux limitus, unspecified laterality 08/21/2017     Priority: Medium     Familial hyperlipidemia      Priority: Medium     pt reports onset two years ago but labs from 2005 elevated LDL       Depression      Priority: Medium     Fatigue      Priority: Medium       Reason For Visit   Patient here for [facility] early detection of atherosclerosis and CVD exam.    Pain Evaluation  Current history of pain associated with this visit is: denied    Cardiac risk factors:  + age     + smoking (ex)   + elevated BMI     + Family history CVD    - Diet   + Hypertension    HPI   Jeanie Pavon is a 72 year old year old female with a family history of heart disease, hyperlipidemia, hypertension, meniere disease,, depression, and alcohol abuse.  CAC score of 27, 50-75th percentile 12/2017.  She was seen in the [facility] CV Prevention clinic in 2017, score 9. It was recommended that she start a statin medication but she has not wanted to do that. Her PCP has also recommended that she start a cholesterol medication.  Her primary care provider is LEANRD Hernandez at [facility] in [place]. She is retired, she worked in high schools on art projects.  She has palpitation 3x/week that last for seconds. Today in clinic she denies chest pain at rest, with activity, while sleeping, SOB at rest, with activity or while sleeping, palpitations, lightheadedness, lower leg edema, calf cramps, indigestion, headaches or issues with her memory.     Nutrition assessment per patient report:   Foods with fat/cholesterol (fried foods, fatty meats, junk food):  1x/month  Fruits and vegetables  (  cup cooked, 1 cup raw):  1-3 servings of vegetables/day, fruit 1-3 servings/day  Caffeine (1 cup coffee, soda, etc):  1 cup coffee/day  Alcohol servings (12 oz. beer, 4 oz. wine, 1  oz. in mixed drink):  none X 33 years  Special dietary habits:  salt  Typical breakfast:  Peanut butter sandwich                 Lunch: pasta, fruit and vegetables                 Dinner: protein and vegetable                 Snacks: cheese, fruit                 Drinks:  water  Activity  Patient is not active, she used to have an exercise routine but does not currently    Sleep pattern:    Laboratory Results Review  We discussed laboratory results today including lipids targets and how foods influence cholesterol.    Weight  Her perceived healthy weight is 153 pounds.  A normal BMI of 25 is equal to 148 pounds.  The current BMI of 31.1 is overweight range.  A weight reduction speed of 1-2 lbs per month for women is recommended.    PMH   Past Medical History:   Diagnosis Date     Agatston coronary artery calcium score less than 100 2017    score of 27, 50-75% range     Benign essential hypertension      Depression      Familial hyperlipidemia     pt reports onset two years ago but labs from 2005 elevated LDL     Fatigue      History of alcohol abuse     no alcohol for 20 years     Meniere disease 1987    coil in right inner ear, endolymphatic shunt       PSH  Past Surgical History:   Procedure Laterality Date     DECOMPRESS, ENHANCE ENDOLYMPHATIC SAC Right 1987       Current Meds   Current Outpatient Medications   Medication Sig Dispense Refill     buPROPion (WELLBUTRIN XL) 150 MG 24 hr tablet   1     cholecalciferol 25 MCG (1000 UT) TABS Take 1,000 Units by mouth       FLUoxetine (PROZAC) 20 MG capsule TAKE THREE CAPSULES BY MOUTH DAILY       hydrochlorothiazide (HYDRODIURIL) 12.5 MG tablet Take 1 Tablet (12.5 mg) by mouth daily.         Allergies      Allergies   Allergen Reactions     Alcohol Other (See Comments)     Sober 28 years  "    Codeine Anxiety and Palpitations       Family Hx   Family History   Problem Relation Age of Onset     Coronary Artery Disease Mother 60        s/pMI at age 60, s/p CABG X 2, CHF     Kidney Disease Mother         cause of death     Macular Degeneration Mother      Cerebrovascular Disease Father 88     Pancreatic Cancer Sister         cause of death     Deep Vein Thrombosis Sister      Coronary Artery Disease Brother 77        s/p CABG at age 77     Hypertension Brother      Deep Vein Thrombosis Brother      Hypertension Brother      Hypertension Brother      Crohn's Disease Brother      Hypertension Brother      Hyperlipidemia Brother      Hyperlipidemia Brother      Coronary Artery Disease Brother 62        s/p MI, s/p cardiac arrest (smoker)     Coronary Artery Disease Maternal Grandfather 60     Heart Disease Paternal Grandfather      Ankylosing Spondylitis Son      Sarah-Danlos syndrome Daughter      Glaucoma No family hx of        Social History  She is retired.   She is  with 2 children    Tobacco History  History   Smoking Status     Former Smoker     Packs/day: 2.00     Years: 20.00     Types: Cigarettes     Start date: 1967     Quit date: 1987   Smokeless Tobacco     Never Used       ROS  CONSTITUTIONAL:  No fever, chills, or sweats. No weight gain/loss.   EENT:  No visual disturbance, ear ache, epistaxis, sore throat  ALLERGIES/IMMUNOLOGIC:  Negative  RESPIRATORY:  No cough, hemoptysis  CARDIOVASCULAR:  As per HPI  GI:  No nausea, vomiting, hematemesis, melena  :  No urinary frequency, dysuria, or hematuria  INTEGUMENT:  Negative  PSYCHIATRIC:  Negative  NEURO:  Negative  ENDOCRINE:  Negative  MUSCULOSKELETAL:  Negative     Vital Signs   /77 (BP Location: Right arm, Patient Position: Sitting, Cuff Size: Adult Regular)   Pulse 68   Ht 1.613 m (5' 3.5\")   Wt 80.9 kg (178 lb 6.4 oz)   SpO2 97%   BMI 31.11 kg/m        Waist: 38.5 inches  Hip: 43.5 inches    Physical Exam   In general, " the patient is a pleasant female in no apparent distress   HEENT: NC/AT, PERRLA, EOMI, sclerae white, not injected. Nares clear, pharynx without erythema or exudate, dentition intact    Neck: No adenopathy, no thyromegaly, carotids +4/4 bilaterally without bruits,  no jugular venous distension   Lungs: Breath sounds clear bilaterally, without crackles, ronchi, or wheezes  Cor: RRR, S1S2 without murmur, rub, click, or gallop, the PMI is in the 5th ICS in the midclavicular line  Abdomen: Soft, nontender, nondistended, BS+ in all 4 quadrants, without hepatomegaly, no aorta or renal artery bruits  Extremities: No clubbing, cyanosis, or edema. DP and PT pulses +2/4 bilaterally    The 10-year ASCVD risk score (Pdamaja CAICEDO Jr., et al., 2013) is: 25.3%  Values used to calculate the score:   Age: 72 year old   Sex: female   Is Non- : No   Diabetic: No   Tobacco smoker: No   Systolic Blood Press: 129 mmHg   Is BP treated: Yes   HDL Cholesterol: 54 mg/dL   Total Cholesterol: 280 mg/dL    Recent Labs  Lab Results   Component Value Date    GLC 99 06/30/2022    GLC 86 04/24/2017      Lab Results   Component Value Date    NTBNP 182 (H) 04/24/2017     No results found for: NTBNPI   Lab Results   Component Value Date    UCRR 184 06/30/2022    UCRR 83 04/24/2017      Lab Results   Component Value Date    MICROL 15 06/30/2022    MICROL 6 04/24/2017      No results found for: MICROALBUMIN   Lab Results   Component Value Date    CRP 7.80 06/30/2022    CRP 4.5 04/24/2017      Lab Results   Component Value Date    CHOL 280 (H) 06/30/2022    CHOL 288 (H) 04/24/2017      Lab Results   Component Value Date    TRIG 195 (H) 06/30/2022    TRIG 207 (H) 04/24/2017      Lab Results   Component Value Date    HDL 54 06/30/2022    HDL 55 04/24/2017      Lab Results   Component Value Date     (H) 06/30/2022     (H) 04/24/2017      No results found for: VLDL   No results found for: CHOLHDLRATIO  Lab Results   Component  Value Date    NHDL 226 (H) 06/30/2022    NHDL 233 (H) 04/24/2017        Assessment:    Cardiovascular:  Asymptomatic, she is not complaining of chest pain, CAC in 2016, recommend repeating CAC      Blood Pressure:  She takes BP medication, hydrochlorothiazide 12.5 mg, -129/77-85 mmHg    Lipids:  She does not take a statin medication, recommend starting 10 mg of atorvastatin. She would like to start with a very low dose, 10 mg every other day X 2-4 weeks and slowly advance.  Recheck FLP 10-12 weeks after she is taking 10 mg every day.   !LIPID/HEPATIC Latest Ref Rng & Units 4/24/2017 6/30/2022   CHOL <200 mg/dL 288 (H) 280 (H)   TRIGLYCERIDES <150 mg/dL 207 (H) 195 (H)   HDL >=50 mg/dL 55 54   LDL <=100 mg/dL 192 (H) 187 (H)   NHDL <130 mg/dL 233 (H) 226 (H)   CRP mg/L 4.5        Glucose: 99    Sleep pattern:  Sleep hygiene reviewed during clinic visit, handout given to patient    Weight Management: BMI 31.1, refer to comprehensive weight management    Return to Clinic: 5 years    Health Habit Summary:  Nutrition: Heart Healthy Eating:  most of the time   Exercise:  not regularly active  Weight:  high-risk range  Tobacco Use:  ex-smoker    Full report to follow prevention team review of test results with scanned final report.    Time spent for patient visit was 60 minutes with more than half the time spent on counseling and coordination of care.    MIREYA lElison CNP       CC  Patient Care Team:  Lexy Escamilla as PCP - General (Physician Assistant)  Miryam Rai APRN CNP as Nurse Practitioner (Nurse Practitioner)  Ab Hong APRN CNP as Nurse Practitioner (Cardiovascular Disease)  AB HONG Test Results    WALKING BLOOD PRESSURE RESPONSE (3 minute, 5 MET level walk)   Pre BP: 122/80 mmHg  3 min BP: 148/72 mmHg  1 min post BP: 150/80 mmHg    Pre HR: 78 bpm  3 min HR: 140 bpm  1 min post HR: 80 bpm     Test results: Walking blood pressure response to 3 minutes activity  is in normal range.     ABDOMINAL AORTA ULTRASOUND (< 2.5 normal, borderline 2.5-2.9, abnormal > 3)   SupraIliac 1.7 cm    SupraRenal 1.7 cm    InfraRenal Proximal 2.0 cm    InfraRenal Distal 1.9 cm      Abdominal Aorta Assessment:  normal    LEFT VENTRICULAR ULTRASOUND MEASUREMENTS (adjusted for BSA)  LVIDD 49.10 mm   Septa 8.4 mm   Posterior 8.8 mm     Left Ventricular US Assessment:  normal    Carotid Artery IMT measurements report and plaques in the small area examined:   Left IMT 1.041 mm  Plaques small heterogeneous plaque in left bulb area size 1.3 mm     Right IMT 0.642 mm  Plaques  small heterogeneous plaque in right bulb area size 2.7 mm.      Test results: carotid artery wall thickening is in abnormal range with plaque formations.     ECG (see tracing):  normal sinus rhythm;  rate: 68 bpm    Arterial Elasticity per age and gender (see printout):   C1 11.5 mL/mmHg x 10  normal   C2 2.3 mL/mmHg x 100 abnormal   Supine blood pressure: 139/83 mmHg     Test results: arterial elasticity of the large size arteries is in normal range after adjusting for age and gender. Arterial elasticity of the small size arteries is in abnormal range after adjusting for age and gender.       Alis Gallegos, MIREYA CNP

## 2022-06-30 NOTE — PROGRESS NOTES
[facility] Prevention - Exam Note    Active Problems   Patient Active Problem List    Diagnosis Date Noted     Dermatochalasis of both upper eyelids 02/22/2019     Priority: Medium     Brow ptosis 02/22/2019     Priority: Medium     Hallux limitus, unspecified laterality 08/21/2017     Priority: Medium     Familial hyperlipidemia      Priority: Medium     pt reports onset two years ago but labs from 2005 elevated LDL       Depression      Priority: Medium     Fatigue      Priority: Medium       Reason For Visit   Patient here for [facility] early detection of atherosclerosis and CVD exam.    Pain Evaluation  Current history of pain associated with this visit is: denied    Cardiac risk factors:  + age     + smoking (ex)   + elevated BMI     + Family history CVD    - Diet   + Hypertension    HPI   Jeanie Pavon is a 72 year old year old female with a family history of heart disease, hyperlipidemia, hypertension, meniere disease,, depression, and alcohol abuse.  CAC score of 27, 50-75th percentile 12/2017.  She was seen in the [facility] CV Prevention clinic in 2017, score 9. It was recommended that she start a statin medication but she has not wanted to do that. Her PCP has also recommended that she start a cholesterol medication.  Her primary care provider is LENARD Hernandez at [facility] in [place]. She is retired, she worked in high schools on art projects.  She has palpitation 3x/week that last for seconds. Today in clinic she denies chest pain at rest, with activity, while sleeping, SOB at rest, with activity or while sleeping, palpitations, lightheadedness, lower leg edema, calf cramps, indigestion, headaches or issues with her memory.     Nutrition assessment per patient report:   Foods with fat/cholesterol (fried foods, fatty meats, junk food):  1x/month  Fruits and vegetables (  cup cooked, 1 cup raw):  1-3 servings of vegetables/day, fruit 1-3  servings/day  Caffeine (1 cup coffee, soda, etc):  1 cup coffee/day  Alcohol servings (12 oz. beer, 4 oz. wine, 1  oz. in mixed drink):  none X 33 years  Special dietary habits:  salt  Typical breakfast:  Peanut butter sandwich                 Lunch: pasta, fruit and vegetables                 Dinner: protein and vegetable                 Snacks: cheese, fruit                 Drinks:  water  Activity  Patient is not active, she used to have an exercise routine but does not currently    Sleep pattern:    Laboratory Results Review  We discussed laboratory results today including lipids targets and how foods influence cholesterol.    Weight  Her perceived healthy weight is 153 pounds.  A normal BMI of 25 is equal to 148 pounds.  The current BMI of 31.1 is overweight range.  A weight reduction speed of 1-2 lbs per month for women is recommended.    PMH   Past Medical History:   Diagnosis Date     Agatston coronary artery calcium score less than 100 2017    score of 27, 50-75% range     Benign essential hypertension      Depression      Familial hyperlipidemia     pt reports onset two years ago but labs from 2005 elevated LDL     Fatigue      History of alcohol abuse     no alcohol for 20 years     Meniere disease 1987    coil in right inner ear, endolymphatic shunt       PSH  Past Surgical History:   Procedure Laterality Date     DECOMPRESS, ENHANCE ENDOLYMPHATIC SAC Right 1987       Current Meds   Current Outpatient Medications   Medication Sig Dispense Refill     buPROPion (WELLBUTRIN XL) 150 MG 24 hr tablet   1     cholecalciferol 25 MCG (1000 UT) TABS Take 1,000 Units by mouth       FLUoxetine (PROZAC) 20 MG capsule TAKE THREE CAPSULES BY MOUTH DAILY       hydrochlorothiazide (HYDRODIURIL) 12.5 MG tablet Take 1 Tablet (12.5 mg) by mouth daily.         Allergies      Allergies   Allergen Reactions     Alcohol Other (See Comments)     Sober 28 years     Codeine Anxiety and Palpitations       Family Hx   Family History  "  Problem Relation Age of Onset     Coronary Artery Disease Mother 60        s/pMI at age 60, s/p CABG X 2, CHF     Kidney Disease Mother         cause of death     Macular Degeneration Mother      Cerebrovascular Disease Father 88     Pancreatic Cancer Sister         cause of death     Deep Vein Thrombosis Sister      Coronary Artery Disease Brother 77        s/p CABG at age 77     Hypertension Brother      Deep Vein Thrombosis Brother      Hypertension Brother      Hypertension Brother      Crohn's Disease Brother      Hypertension Brother      Hyperlipidemia Brother      Hyperlipidemia Brother      Coronary Artery Disease Brother 62        s/p MI, s/p cardiac arrest (smoker)     Coronary Artery Disease Maternal Grandfather 60     Heart Disease Paternal Grandfather      Ankylosing Spondylitis Son      Sarah-Danlos syndrome Daughter      Glaucoma No family hx of        Social History  She is retired.   She is  with 2 children    Tobacco History  History   Smoking Status     Former Smoker     Packs/day: 2.00     Years: 20.00     Types: Cigarettes     Start date: 1967     Quit date: 1987   Smokeless Tobacco     Never Used       ROS  CONSTITUTIONAL:  No fever, chills, or sweats. No weight gain/loss.   EENT:  No visual disturbance, ear ache, epistaxis, sore throat  ALLERGIES/IMMUNOLOGIC:  Negative  RESPIRATORY:  No cough, hemoptysis  CARDIOVASCULAR:  As per HPI  GI:  No nausea, vomiting, hematemesis, melena  :  No urinary frequency, dysuria, or hematuria  INTEGUMENT:  Negative  PSYCHIATRIC:  Negative  NEURO:  Negative  ENDOCRINE:  Negative  MUSCULOSKELETAL:  Negative     Vital Signs   /77 (BP Location: Right arm, Patient Position: Sitting, Cuff Size: Adult Regular)   Pulse 68   Ht 1.613 m (5' 3.5\")   Wt 80.9 kg (178 lb 6.4 oz)   SpO2 97%   BMI 31.11 kg/m        Waist: 38.5 inches  Hip: 43.5 inches    Physical Exam   In general, the patient is a pleasant female in no apparent distress   HEENT: " NC/AT, PERRLA, EOMI, sclerae white, not injected. Nares clear, pharynx without erythema or exudate, dentition intact    Neck: No adenopathy, no thyromegaly, carotids +4/4 bilaterally without bruits,  no jugular venous distension   Lungs: Breath sounds clear bilaterally, without crackles, ronchi, or wheezes  Cor: RRR, S1S2 without murmur, rub, click, or gallop, the PMI is in the 5th ICS in the midclavicular line  Abdomen: Soft, nontender, nondistended, BS+ in all 4 quadrants, without hepatomegaly, no aorta or renal artery bruits  Extremities: No clubbing, cyanosis, or edema. DP and PT pulses +2/4 bilaterally    The 10-year ASCVD risk score (Padmaja CAICEDO Jr., et al., 2013) is: 25.3%  Values used to calculate the score:   Age: 72 year old   Sex: female   Is Non- : No   Diabetic: No   Tobacco smoker: No   Systolic Blood Press: 129 mmHg   Is BP treated: Yes   HDL Cholesterol: 54 mg/dL   Total Cholesterol: 280 mg/dL    Recent Labs  Lab Results   Component Value Date    GLC 99 06/30/2022    GLC 86 04/24/2017      Lab Results   Component Value Date    NTBNP 182 (H) 04/24/2017     No results found for: NTBNPI   Lab Results   Component Value Date    UCRR 184 06/30/2022    UCRR 83 04/24/2017      Lab Results   Component Value Date    MICROL 15 06/30/2022    MICROL 6 04/24/2017      No results found for: MICROALBUMIN   Lab Results   Component Value Date    CRP 7.80 06/30/2022    CRP 4.5 04/24/2017      Lab Results   Component Value Date    CHOL 280 (H) 06/30/2022    CHOL 288 (H) 04/24/2017      Lab Results   Component Value Date    TRIG 195 (H) 06/30/2022    TRIG 207 (H) 04/24/2017      Lab Results   Component Value Date    HDL 54 06/30/2022    HDL 55 04/24/2017      Lab Results   Component Value Date     (H) 06/30/2022     (H) 04/24/2017      No results found for: VLDL   No results found for: CHOLHDLRATIO  Lab Results   Component Value Date    NHDL 226 (H) 06/30/2022    NHDL 233 (H) 04/24/2017         Assessment:    Cardiovascular:  Asymptomatic, she is not complaining of chest pain, CAC in 2016, recommend repeating CAC      Blood Pressure:  She takes BP medication, hydrochlorothiazide 12.5 mg, -129/77-85 mmHg    Lipids:  She does not take a statin medication, recommend starting 10 mg of atorvastatin. She would like to start with a very low dose, 10 mg every other day X 2-4 weeks and slowly advance.  Recheck FLP 10-12 weeks after she is taking 10 mg every day.   !LIPID/HEPATIC Latest Ref Rng & Units 4/24/2017 6/30/2022   CHOL <200 mg/dL 288 (H) 280 (H)   TRIGLYCERIDES <150 mg/dL 207 (H) 195 (H)   HDL >=50 mg/dL 55 54   LDL <=100 mg/dL 192 (H) 187 (H)   NHDL <130 mg/dL 233 (H) 226 (H)   CRP mg/L 4.5        Glucose: 99    Sleep pattern:  Sleep hygiene reviewed during clinic visit, handout given to patient    Weight Management: BMI 31.1, refer to comprehensive weight management    Return to Clinic: 5 years    Health Habit Summary:  Nutrition: Heart Healthy Eating:  most of the time   Exercise:  not regularly active  Weight:  high-risk range  Tobacco Use:  ex-smoker    Full report to follow prevention team review of test results with scanned final report.    Time spent for patient visit was 60 minutes with more than half the time spent on counseling and coordination of care.    MIREYA Ellison CNP       CC  Patient Care Team:  Lexy Escamilla as PCP - General (Physician Assistant)  Miryam Rai APRN CNP as Nurse Practitioner (Nurse Practitioner)  Ab Hong APRN CNP as Nurse Practitioner (Cardiovascular Disease)  AB HONG

## 2022-07-01 LAB
ATRIAL RATE - MUSE: 68 BPM
DIASTOLIC BLOOD PRESSURE - MUSE: NORMAL MMHG
INTERPRETATION ECG - MUSE: NORMAL
P AXIS - MUSE: 24 DEGREES
PR INTERVAL - MUSE: 184 MS
QRS DURATION - MUSE: 92 MS
QT - MUSE: 402 MS
QTC - MUSE: 427 MS
R AXIS - MUSE: 4 DEGREES
SYSTOLIC BLOOD PRESSURE - MUSE: NORMAL MMHG
T AXIS - MUSE: 29 DEGREES
VENTRICULAR RATE- MUSE: 68 BPM

## 2022-07-05 NOTE — PROGRESS NOTES
[facility] Test Results    WALKING BLOOD PRESSURE RESPONSE (3 minute, 5 MET level walk)   Pre BP: 122/80 mmHg  3 min BP: 148/72 mmHg  1 min post BP: 150/80 mmHg    Pre HR: 78 bpm  3 min HR: 140 bpm  1 min post HR: 80 bpm     Test results: Walking blood pressure response to 3 minutes activity is in normal range.     ABDOMINAL AORTA ULTRASOUND (< 2.5 normal, borderline 2.5-2.9, abnormal > 3)   SupraIliac 1.7 cm    SupraRenal 1.7 cm    InfraRenal Proximal 2.0 cm    InfraRenal Distal 1.9 cm      Abdominal Aorta Assessment:  normal    LEFT VENTRICULAR ULTRASOUND MEASUREMENTS (adjusted for BSA)  LVIDD 49.10 mm   Septa 8.4 mm   Posterior 8.8 mm     Left Ventricular US Assessment:  normal    Carotid Artery IMT measurements report and plaques in the small area examined:   Left IMT 1.041 mm  Plaques small heterogeneous plaque in left bulb area size 1.3 mm     Right IMT 0.642 mm  Plaques  small heterogeneous plaque in right bulb area size 2.7 mm.      Test results: carotid artery wall thickening is in abnormal range with plaque formations.     ECG (see tracing):  normal sinus rhythm;  rate: 68 bpm    Arterial Elasticity per age and gender (see printout):   C1 11.5 mL/mmHg x 10  normal   C2 2.3 mL/mmHg x 100 abnormal   Supine blood pressure: 139/83 mmHg     Test results: arterial elasticity of the large size arteries is in normal range after adjusting for age and gender. Arterial elasticity of the small size arteries is in abnormal range after adjusting for age and gender.       Alis Toure

## 2022-07-07 RX ORDER — ATORVASTATIN CALCIUM 10 MG/1
10 TABLET, FILM COATED ORAL DAILY
Qty: 90 TABLET | Refills: 3 | Status: SHIPPED | OUTPATIENT
Start: 2022-07-07

## 2022-07-07 NOTE — PATIENT INSTRUCTIONS
Screening Results Summary Report     [facility] for Cardiovascular Disease Prevention    Thank you for choosing to participate in the prevention screening offered at the [facility]. Prevention screening is important part of health care.  Atherosclerosis may result in heart attacks, strokes, heart failure, peripheral artery disease and shortened life expectancy. The risk for premature development of this disease is both genetic (family history) and environmental (diet, exercise, lifestyle, etc.).  Goals of your cardiovascular prevention screening include detecting the earliest signs of blood vessel or heart abnormalities, and identifying markers for risk that can be treated if identified early. Recommendations are included to improve health habits. In some cases medication may be recommended to help slow progression of disease. Our goal is to assist you in prevention of a heart attack, stroke and other cardiovascular diseases that are the major cause of illness and mortality in our society.    Your total cholesterol and LDL (bad cholesterol) results are not in the  optimal  range. Your HDL cholesterol numbers is at goal, triglycerides are slightly elevated.  Target levels depend on the presence or absence of cardiovascular disease.   Your target level of total cholesterol are less than 200 mg/dL, LDL less than 100 mg/dL, HDL 40 ml/dL or higher, and a triglyceride level of less than 150 mg/dL.  We recommend continuation of ongoing health habit modification (heart healthy nutrition, maintaining your weight within a normal weight range and an exercise routine) to maintain these levels.  An ideal weight range is a body mass index of 25 or less.  We recommend starting 10 mg of atorvastatin.  An order has been sent to your local pharmacy.  An order has been placed in your electronic medical record to recheck your fasting lipid profile in 10-12 week after starting this medication.   Please call [phone] to  schedule a lab appointment.  You and I also discussed starting with a very low dose, 10 mg 3 days/week and then slowly advancing the dose over 2-4 weeks.      2.  Your C-reactive protein is elevated.  An elevated CRP is an indicator of inflammation.  Inflammation may affect the condition of the blood vessel wall and contribute to the development of arteriosclerosis (fatty deposits with blood vessel thickening).  Conditions such as infections, colds, flu and inflammatory conditions can cause CRP to elevate for a period of time and return to normal.  The specific cause of your elevated CRP is unknown.  No specific treatment to lower CRP is available, but risk factor reduction is important.      3. A BMI (body mass index) of over 30 increases the risk for heart attack, stroke, and diabetes.  Your BMI is 31.1and adds to your risk.  A BMI of less than 30 is recommended.  The closer you can get to a BMI of 25 and still feel healthy for your musculature and frame size the better.  Weight reduction, even small amounts (5-10 pounds) can help reduce cholesterol levels.  For men, 2-3 pounds/month and for women, 1-2 pounds/month of weight loss is recommended with an initial goal of approximately 10% reduction of your body weight per year.  Slow weight loss will increase the chances that you will maintain your weight over time.  A dramatic fluctuation in weight (up and down weight) increases your cardiovascular risk and is not recommended.  Overall, a healthy stable weight is preferred.  We recommend a consultation with the [facility] Comprehensive Weight Management program.  An order has been placed in your electronic medical chart for this consultation. You can schedule the consultation by calling [phone].    4.  Your arterial elasticity (artery stiffness) is low and is consistent with abnormalities associated with the development of vascular (blood vessel) disease and high blood pressure. Statin cholesterol medications,  some blood pressure medications and healthy living habits particularly exercise are helpful to preserve artery elasticity. Monitor your blood pressure once/week, record the results and bring those results with you to your next primary care clinic visit.  Notify your primary care provider if you notice that your blood pressure readings are increasing. Omron is good home blood pressure monitor brand to purchase.  This blood pressure cuff machine can be purchased at [employer], [employer], [employer] or [email or web address].     5. Your Carotid IMT (see test description for explanation) is thickened and increases cardiovascular risk.   In the small area that was visualized, small plaque (fatty deposits) were identified. Good blood flow continues with these small plaques but is it important to prevent further growth over time. Statin cholesterol medications and heart healthy habits are important to help prevent growth of plaques over time.  We would like to repeat this measurement at your next [facility] Prevention Clinic visit to monitor if it is changing over time.    6. The level of microalbumin (small protein) in your urine is abnormal for our Sheikh protocol but normal for kidney function.  This can be an abnormality associated with reduced small blood vessel (microvascular) function.  Other factors that can influence albumin are infection, menstruation, heavy weight lifting, diabetes, and kidney problems. Your kidney function blood test, creatinine was normal at insert creatinine mg/dl (normal range 0.66 to 1.25 mg/dl).  No follow up is required at this time.    7. Your diet is heart healthy and well balanced.  We recommend increasing your intake of vegetables to 4-5 servings/day and increasing your fruit intake to 3-4 servings/day and incorporating healthy fats of the the Mediterranean diet into your diet. Eating salmon and using extra virgin olive oil are good examples. Nutrients found in fruits, vegetables and whole grains have  been shown to be beneficial for the long-term health of your heart and blood vessels.     8. The American Heart Association recommends 150 minutes of exercise per week, including strength (resistance) training.  Regular exercise can help maintain or lower cholesterol, blood pressure, blood glucose and improve the health of your heart and blood vessels. We recommend increasing your exercise routine to 5 days per week and adding a cardiovascular component to your exercise routine.  Always exercise within your comfort zone (no chest pain, able to talk comfortably).     9. A coronary artery calcium scan is recommended to further assess your cardiovascular risk. This test may or may not be covered by insurance and costs approximately $99 ($105 with tax) and can be obtained at a variety of cardiovascular centers. If lung nodules are observed with this test follow up scans may be needed for further evaluation. An order for this diagnostic test has been placed in your electronic medical record.  Call [facility] at [phone] to schedule this non-invasive diagnostic test.     10. We suggest that you consider incorporating 4-7-8 relaxation breathing, mindfulness stress reduction, meditation, yoga,and/or aromatherapy into your healthy lifestyle routine. All of these integrative therapies have been shown to be useful in reducing stress and promoting health. The website for the [facility] at the [facility] is [email or web address]. Taking Charge of Your Health and Wellbeing is a wonderful assessment tool to learn more about your wellbeing.    11. Return to clinic in 5 years.    Thank you for choosing to participate in the prevention screening at [facility] CV Prevention clinic.  Cardiovascular prevention screening is important. Atherosclerosis may result in heart attacks, strokes, heart failure, peripheral artery disease.    Devi Gallegos, DNP, APRN, FNP-C

## 2022-10-15 ENCOUNTER — HEALTH MAINTENANCE LETTER (OUTPATIENT)
Age: 72
End: 2022-10-15

## 2023-01-29 ENCOUNTER — NURSE TRIAGE (OUTPATIENT)
Dept: NURSING | Facility: CLINIC | Age: 73
End: 2023-01-29
Payer: COMMERCIAL

## 2023-01-29 ENCOUNTER — HOSPITAL ENCOUNTER (EMERGENCY)
Facility: CLINIC | Age: 73
Discharge: HOME | End: 2023-01-29
Attending: EMERGENCY MEDICINE | Admitting: EMERGENCY MEDICINE
Payer: COMMERCIAL

## 2023-01-29 VITALS
SYSTOLIC BLOOD PRESSURE: 161 MMHG | HEART RATE: 99 BPM | RESPIRATION RATE: 16 BRPM | OXYGEN SATURATION: 98 % | DIASTOLIC BLOOD PRESSURE: 92 MMHG | TEMPERATURE: 97.4 F

## 2023-01-29 DIAGNOSIS — I47.10 SVT (SUPRAVENTRICULAR TACHYCARDIA) (H): ICD-10-CM

## 2023-01-29 LAB
ANION GAP SERPL CALCULATED.3IONS-SCNC: 13 MMOL/L (ref 7–15)
BASOPHILS # BLD AUTO: 0.1 10E3/UL (ref 0–0.2)
BASOPHILS NFR BLD AUTO: 1 %
BUN SERPL-MCNC: 20.7 MG/DL (ref 8–23)
CALCIUM SERPL-MCNC: 8.9 MG/DL (ref 8.8–10.2)
CHLORIDE SERPL-SCNC: 100 MMOL/L (ref 98–107)
CREAT SERPL-MCNC: 1.29 MG/DL (ref 0.51–0.95)
DEPRECATED HCO3 PLAS-SCNC: 24 MMOL/L (ref 22–29)
EOSINOPHIL # BLD AUTO: 0.2 10E3/UL (ref 0–0.7)
EOSINOPHIL NFR BLD AUTO: 3 %
ERYTHROCYTE [DISTWIDTH] IN BLOOD BY AUTOMATED COUNT: 13.1 % (ref 10–15)
GFR SERPL CREATININE-BSD FRML MDRD: 44 ML/MIN/1.73M2
GLUCOSE SERPL-MCNC: 126 MG/DL (ref 70–99)
HCT VFR BLD AUTO: 39.5 % (ref 35–47)
HGB BLD-MCNC: 12.8 G/DL (ref 11.7–15.7)
HOLD SPECIMEN: NORMAL
HOLD SPECIMEN: NORMAL
IMM GRANULOCYTES # BLD: 0 10E3/UL
IMM GRANULOCYTES NFR BLD: 0 %
LYMPHOCYTES # BLD AUTO: 1.8 10E3/UL (ref 0.8–5.3)
LYMPHOCYTES NFR BLD AUTO: 22 %
MAGNESIUM SERPL-MCNC: 2 MG/DL (ref 1.7–2.3)
MCH RBC QN AUTO: 29.4 PG (ref 26.5–33)
MCHC RBC AUTO-ENTMCNC: 32.4 G/DL (ref 31.5–36.5)
MCV RBC AUTO: 91 FL (ref 78–100)
MONOCYTES # BLD AUTO: 0.9 10E3/UL (ref 0–1.3)
MONOCYTES NFR BLD AUTO: 11 %
NEUTROPHILS # BLD AUTO: 5.2 10E3/UL (ref 1.6–8.3)
NEUTROPHILS NFR BLD AUTO: 63 %
NRBC # BLD AUTO: 0 10E3/UL
NRBC BLD AUTO-RTO: 0 /100
PLATELET # BLD AUTO: 298 10E3/UL (ref 150–450)
POTASSIUM SERPL-SCNC: 3.8 MMOL/L (ref 3.4–5.3)
RBC # BLD AUTO: 4.36 10E6/UL (ref 3.8–5.2)
SODIUM SERPL-SCNC: 137 MMOL/L (ref 136–145)
TROPONIN T SERPL HS-MCNC: 12 NG/L
TSH SERPL DL<=0.005 MIU/L-ACNC: 2.1 UIU/ML (ref 0.3–4.2)
WBC # BLD AUTO: 8.3 10E3/UL (ref 4–11)

## 2023-01-29 PROCEDURE — 96360 HYDRATION IV INFUSION INIT: CPT

## 2023-01-29 PROCEDURE — 83735 ASSAY OF MAGNESIUM: CPT | Performed by: EMERGENCY MEDICINE

## 2023-01-29 PROCEDURE — 85004 AUTOMATED DIFF WBC COUNT: CPT | Performed by: EMERGENCY MEDICINE

## 2023-01-29 PROCEDURE — 80048 BASIC METABOLIC PNL TOTAL CA: CPT | Performed by: EMERGENCY MEDICINE

## 2023-01-29 PROCEDURE — 99284 EMERGENCY DEPT VISIT MOD MDM: CPT | Mod: 25

## 2023-01-29 PROCEDURE — 250N000011 HC RX IP 250 OP 636

## 2023-01-29 PROCEDURE — 84484 ASSAY OF TROPONIN QUANT: CPT | Performed by: EMERGENCY MEDICINE

## 2023-01-29 PROCEDURE — 36415 COLL VENOUS BLD VENIPUNCTURE: CPT | Performed by: EMERGENCY MEDICINE

## 2023-01-29 PROCEDURE — 84443 ASSAY THYROID STIM HORMONE: CPT | Performed by: EMERGENCY MEDICINE

## 2023-01-29 PROCEDURE — 93005 ELECTROCARDIOGRAM TRACING: CPT

## 2023-01-29 PROCEDURE — 258N000003 HC RX IP 258 OP 636: Performed by: EMERGENCY MEDICINE

## 2023-01-29 RX ORDER — ADENOSINE 3 MG/ML
INJECTION, SOLUTION INTRAVENOUS
Status: COMPLETED
Start: 2023-01-29 | End: 2023-01-29

## 2023-01-29 RX ADMIN — SODIUM CHLORIDE 1000 ML: 9 INJECTION, SOLUTION INTRAVENOUS at 18:38

## 2023-01-29 RX ADMIN — ADENOSINE 6 MG: 3 INJECTION INTRAVENOUS at 18:45

## 2023-01-29 ASSESSMENT — ACTIVITIES OF DAILY LIVING (ADL): ADLS_ACUITY_SCORE: 35

## 2023-01-29 NOTE — TELEPHONE ENCOUNTER
TRIAGE CALL - Is this a 2nd Level Triage? No  Nurse Triage SBAR - Patient calling   Situation:  tachicardia  Background:    Started a new HTN medication ; losartan this last week  Assessment:  Pulse 168 - 150 - almost an hour    Blood pressure  - 84/75 -   Vitals taken last: 20 minutes ago   When she gets up Dizziness and lightheaded   Drinking fluids   Schedule to have surgery on wednesday   Patients denies difficulty with breathing, chest pain, or fever  Patient is able to speak in full long sentences without getting short of breath.  Measures taken: None  Pt s PCP/Clinic: Lexy Escamilla - [facility]  Note will be sent to his PCP team.  Recommended Disposition per protocol: 911 now - pt is not alone   Patient verbalized understanding and agrees with plan  Fara Nunez RN Nurse Triage Advisor 5:56 PM 1/29/2023    Reason for Disposition    [1] Dizziness, lightheadedness, or weakness AND [2] heart beating very rapidly (e.g., > 140 / minute)    Additional Information    Negative: Passed out (i.e., lost consciousness, collapsed and was not responding)    Negative: Shock suspected (e.g., cold/pale/clammy skin, too weak to stand, low BP, rapid pulse)    Negative: Difficult to awaken or acting confused (e.g., disoriented, slurred speech)    Negative: Visible sweat on face or sweat dripping down face    Negative: Unable to walk, or can only walk with assistance (e.g., requires support)    Negative: [1] Received SHOCK from implantable cardiac defibrillator AND [2] persisting symptoms (i.e., palpitations, lightheadedness)    Protocols used: HEART RATE AND HEARTBEAT [id number]

## 2023-01-30 LAB
ATRIAL RATE - MUSE: 148 BPM
DIASTOLIC BLOOD PRESSURE - MUSE: NORMAL MMHG
INTERPRETATION ECG - MUSE: NORMAL
P AXIS - MUSE: NORMAL DEGREES
PR INTERVAL - MUSE: NORMAL MS
QRS DURATION - MUSE: 84 MS
QT - MUSE: 292 MS
QTC - MUSE: 458 MS
R AXIS - MUSE: 36 DEGREES
SYSTOLIC BLOOD PRESSURE - MUSE: NORMAL MMHG
T AXIS - MUSE: 47 DEGREES
VENTRICULAR RATE- MUSE: 148 BPM

## 2023-01-30 NOTE — ED PROVIDER NOTES
History     Chief Complaint:  Palpitations       HPI   Jeanie Pavon is a 72 year old female with a history of factor V Leiden who presents with palpitations.  Symptoms started about an hour ago while laying in bed watching TV.  She reports intermittent palpitations every now and again but nothing to this degree.  No history of WPW or SVT or atrial fibrillation that she knows of.  She denies history of DVT/PE or heart disease.  She denies recent recreational drug or alcohol use.  No excess caffeine use.  No recent illness such as fever, cough, vomiting, diarrhea.  No chest pain or significant shortness of breath.      ROS:  Review of Systems  A 10 point ROS was obtained and negative except as noted here and in HPI    Allergies:  Alcohol  Codeine     Medications:    atorvastatin (LIPITOR) 10 MG tablet  buPROPion (WELLBUTRIN XL) 150 MG 24 hr tablet  cholecalciferol 25 MCG (1000 UT) TABS  FLUoxetine (PROZAC) 20 MG capsule  hydrochlorothiazide (HYDRODIURIL) 12.5 MG tablet        Past Medical History:    Past Medical History:   Diagnosis Date     Agatston coronary artery calcium score less than 100 2017     Benign essential hypertension      Depression      Familial hyperlipidemia      Fatigue      History of alcohol abuse      Meniere disease 1987       Past Surgical History:    Past Surgical History:   Procedure Laterality Date     DECOMPRESS, ENHANCE ENDOLYMPHATIC SAC Right 1987        Family History:    family history includes Ankylosing Spondylitis in her son; Cerebrovascular Disease (age of onset: 88) in her father; Coronary Artery Disease (age of onset: 60) in her maternal grandfather and mother; Coronary Artery Disease (age of onset: 62) in her brother; Coronary Artery Disease (age of onset: 77) in her brother; Crohn's Disease in her brother; Deep Vein Thrombosis in her brother and sister; Sarah-Danlos syndrome in her daughter; Heart Disease in her paternal grandfather; Hyperlipidemia in her brother and  brother; Hypertension in her brother, brother, brother, and brother; Kidney Disease in her mother; Macular Degeneration in her mother; Pancreatic Cancer in her sister.    Social History:   reports that she quit smoking about 36 years ago. Her smoking use included cigarettes. She started smoking about 56 years ago. She has a 40.00 pack-year smoking history. She has never used smokeless tobacco. She reports that she does not currently use alcohol. She reports that she does not currently use drugs.  PCP: Lexy Escamilla     Physical Exam     Patient Vitals for the past 24 hrs:   BP Temp Pulse Resp SpO2   01/29/23 1930 (!) 161/92 -- 99 -- 98 %   01/29/23 1925 (!) 159/95 -- 105 -- 97 %   01/29/23 1850 (!) 163/93 -- 105 -- 98 %   01/29/23 1845 (!) 150/97 -- (!) 147 16 95 %   01/29/23 1830 (!) 85/66 97.4  F (36.3  C) (!) 162 16 97 %        Physical Exam  VS: Reviewed per above  HENT: normal speech  EYES: sclera anicteric  CV: Rate as noted, regular rhythm.   RESP: Effort normal. Breath sounds are normal bilaterally.  GI: no tenderness/rebound/guarding, not distended.  NEURO: Alert, moving all extremities  MSK: No deformity of the extremities  SKIN: Warm and dry    Emergency Department Course     ECG results from 01/29/23   EKG 12-lead, tracing only     Value    Systolic Blood Pressure     Diastolic Blood Pressure     Ventricular Rate 148    Atrial Rate 148    PA Interval     QRS Duration 84        QTc 458    P Axis     R AXIS 36    T Axis 47    Interpretation ECG      Supraventricular tachycardia  Otherwise normal ECG  When compared with ECG of 30-JUN-2022 08:08,  Vent. rate has increased BY  80 BPM         Laboratory:  Labs Ordered and Resulted from Time of ED Arrival to Time of ED Departure   BASIC METABOLIC PANEL - Abnormal       Result Value    Sodium 137      Potassium 3.8      Chloride 100      Carbon Dioxide (CO2) 24      Anion Gap 13      Urea Nitrogen 20.7      Creatinine 1.29 (*)     Calcium 8.9       Glucose 126 (*)     GFR Estimate 44 (*)    TROPONIN T, HIGH SENSITIVITY - Normal    Troponin T, High Sensitivity 12     MAGNESIUM - Normal    Magnesium 2.0     TSH WITH FREE T4 REFLEX - Normal    TSH 2.10     CBC WITH PLATELETS AND DIFFERENTIAL    WBC Count 8.3      RBC Count 4.36      Hemoglobin 12.8      Hematocrit 39.5      MCV 91      MCH 29.4      MCHC 32.4      RDW 13.1      Platelet Count 298      % Neutrophils 63      % Lymphocytes 22      % Monocytes 11      % Eosinophils 3      % Basophils 1      % Immature Granulocytes 0      NRBCs per 100 WBC 0      Absolute Neutrophils 5.2      Absolute Lymphocytes 1.8      Absolute Monocytes 0.9      Absolute Eosinophils 0.2      Absolute Basophils 0.1      Absolute Immature Granulocytes 0.0      Absolute NRBCs 0.0       Emergency Department Course & Assessments:             Interventions:  Medications   0.9% sodium chloride BOLUS (0 mLs Intravenous Stopped 1/29/23 1938)   adenosine (ADENOCARD) 6 MG/2ML injection (6 mg  Given 1/29/23 1845)        Disposition:  The patient was discharged to home.     Impression & Plan      Medical Decision Making:  Patient presents to the ER for evaluation of palpitations that onset 1 hour ago.  On arrival vital signs are notable for heart rate in the 160s.  ECG reveals SVT.  After adenosine, patient converted to a sinus rhythm.  Labs are reassuring without evidence of myocardial ischemia, electrolyte derangement, thyroid function abnormality.  Patient remained asymptomatic throughout ER course.  History not supportive of ACS or PE.  Recommended interval primary care follow-up.  Return precautions discussed prior to discharge.      Diagnosis:    ICD-10-CM    1. SVT (supraventricular tachycardia) (H)  I47.1            Discharge Medications:  Discharge Medication List as of 1/29/2023  7:38 PM              Oli Suero MD  01/30/23 5474

## 2023-02-07 ENCOUNTER — HOSPITAL ENCOUNTER (EMERGENCY)
Facility: CLINIC | Age: 73
Discharge: HOME | End: 2023-02-07
Attending: EMERGENCY MEDICINE | Admitting: EMERGENCY MEDICINE
Payer: COMMERCIAL

## 2023-02-07 ENCOUNTER — APPOINTMENT (OUTPATIENT)
Dept: GENERAL RADIOLOGY | Facility: CLINIC | Age: 73
End: 2023-02-07
Attending: EMERGENCY MEDICINE
Payer: COMMERCIAL

## 2023-02-07 ENCOUNTER — APPOINTMENT (OUTPATIENT)
Dept: CT IMAGING | Facility: CLINIC | Age: 73
End: 2023-02-07
Attending: EMERGENCY MEDICINE
Payer: COMMERCIAL

## 2023-02-07 VITALS
DIASTOLIC BLOOD PRESSURE: 99 MMHG | WEIGHT: 185 LBS | HEART RATE: 69 BPM | RESPIRATION RATE: 18 BRPM | HEIGHT: 64 IN | SYSTOLIC BLOOD PRESSURE: 176 MMHG | BODY MASS INDEX: 31.58 KG/M2 | TEMPERATURE: 97.7 F | OXYGEN SATURATION: 98 %

## 2023-02-07 DIAGNOSIS — I10 HYPERTENSION, UNSPECIFIED TYPE: ICD-10-CM

## 2023-02-07 LAB
ANION GAP SERPL CALCULATED.3IONS-SCNC: 11 MMOL/L (ref 7–15)
BASOPHILS # BLD AUTO: 0.1 10E3/UL (ref 0–0.2)
BASOPHILS NFR BLD AUTO: 1 %
BUN SERPL-MCNC: 17.8 MG/DL (ref 8–23)
CALCIUM SERPL-MCNC: 8.9 MG/DL (ref 8.8–10.2)
CHLORIDE SERPL-SCNC: 100 MMOL/L (ref 98–107)
CREAT SERPL-MCNC: 0.86 MG/DL (ref 0.51–0.95)
DEPRECATED HCO3 PLAS-SCNC: 26 MMOL/L (ref 22–29)
EOSINOPHIL # BLD AUTO: 0.2 10E3/UL (ref 0–0.7)
EOSINOPHIL NFR BLD AUTO: 3 %
ERYTHROCYTE [DISTWIDTH] IN BLOOD BY AUTOMATED COUNT: 13.3 % (ref 10–15)
GFR SERPL CREATININE-BSD FRML MDRD: 72 ML/MIN/1.73M2
GLUCOSE SERPL-MCNC: 92 MG/DL (ref 70–99)
HCT VFR BLD AUTO: 37.8 % (ref 35–47)
HGB BLD-MCNC: 12.4 G/DL (ref 11.7–15.7)
HOLD SPECIMEN: NORMAL
HOLD SPECIMEN: NORMAL
IMM GRANULOCYTES # BLD: 0 10E3/UL
IMM GRANULOCYTES NFR BLD: 0 %
LYMPHOCYTES # BLD AUTO: 1.5 10E3/UL (ref 0.8–5.3)
LYMPHOCYTES NFR BLD AUTO: 20 %
MCH RBC QN AUTO: 29.2 PG (ref 26.5–33)
MCHC RBC AUTO-ENTMCNC: 32.8 G/DL (ref 31.5–36.5)
MCV RBC AUTO: 89 FL (ref 78–100)
MONOCYTES # BLD AUTO: 0.7 10E3/UL (ref 0–1.3)
MONOCYTES NFR BLD AUTO: 9 %
NEUTROPHILS # BLD AUTO: 5 10E3/UL (ref 1.6–8.3)
NEUTROPHILS NFR BLD AUTO: 67 %
NRBC # BLD AUTO: 0 10E3/UL
NRBC BLD AUTO-RTO: 0 /100
PLATELET # BLD AUTO: 309 10E3/UL (ref 150–450)
POTASSIUM SERPL-SCNC: 4.2 MMOL/L (ref 3.4–5.3)
RBC # BLD AUTO: 4.25 10E6/UL (ref 3.8–5.2)
SODIUM SERPL-SCNC: 137 MMOL/L (ref 136–145)
TROPONIN T SERPL HS-MCNC: 6 NG/L
WBC # BLD AUTO: 7.4 10E3/UL (ref 4–11)

## 2023-02-07 PROCEDURE — 36415 COLL VENOUS BLD VENIPUNCTURE: CPT | Performed by: EMERGENCY MEDICINE

## 2023-02-07 PROCEDURE — 93005 ELECTROCARDIOGRAM TRACING: CPT

## 2023-02-07 PROCEDURE — 70450 CT HEAD/BRAIN W/O DYE: CPT

## 2023-02-07 PROCEDURE — 99285 EMERGENCY DEPT VISIT HI MDM: CPT | Mod: 25

## 2023-02-07 PROCEDURE — 82310 ASSAY OF CALCIUM: CPT | Performed by: EMERGENCY MEDICINE

## 2023-02-07 PROCEDURE — 84484 ASSAY OF TROPONIN QUANT: CPT | Performed by: EMERGENCY MEDICINE

## 2023-02-07 PROCEDURE — 71046 X-RAY EXAM CHEST 2 VIEWS: CPT

## 2023-02-07 PROCEDURE — 85025 COMPLETE CBC W/AUTO DIFF WBC: CPT | Performed by: EMERGENCY MEDICINE

## 2023-02-07 RX ORDER — LOSARTAN POTASSIUM 25 MG/1
25 TABLET ORAL DAILY
COMMUNITY
Start: 2023-01-25

## 2023-02-07 ASSESSMENT — ENCOUNTER SYMPTOMS
SHORTNESS OF BREATH: 0
ABDOMINAL PAIN: 0
HEADACHES: 1
APPETITE CHANGE: 0
DIFFICULTY URINATING: 1

## 2023-02-07 ASSESSMENT — ACTIVITIES OF DAILY LIVING (ADL): ADLS_ACUITY_SCORE: 35

## 2023-02-07 NOTE — ED PROVIDER NOTES
History     Chief Complaint:  Hypertension       HPI   Jeanie Pavon is a 72 year old female with a history of paroxysmal SVT and Factor V mutation who presents with hypertension. Jeanie states that about one week ago she started to experience a racing heart beat that was associated with at-home high blood pressure. Since this time, she was instructed to keep a blood pressure log and noted that she has been trending high, thus prompting her presentation. During evaluation, Jeanie notes that along with her hypertension she has been experiencing a headache, but she otherwise denies any change in appetite, shortness of breath, chest pain, abdominal pain, or difficulty urinating.       Independent Historian:   None - Patient Only    Review of External Notes: I reviewed the patient's ER visit on 1/29/23 for SVT.      ROS:  Review of Systems   Constitutional: Negative for appetite change.   Respiratory: Negative for shortness of breath.    Cardiovascular: Negative for chest pain.   Gastrointestinal: Negative for abdominal pain.   Genitourinary: Positive for difficulty urinating.   Neurological: Positive for headaches.   All other systems reviewed and are negative.      Allergies:  Codeine     Medications:    Losartan  Hydrochlorothiazide   Atorvastatin  Fluoxetine    Past Medical History:    Hyperlipidemia  Depression  Alcohol abuse  Menière's disease  Vitamin D deficiency  Homozygous Factor V mutation  BPPH  Hyperplastic polyp of stomach  Reactive gastropathy  GERD  Paroxysmal SVT  Mediastinal adenopathy  Lung nodule  Endometrial polyp  Hypertension     Past Surgical History:    Right endolymphatic enhancement  Tubal ligation     Family History:    Throat cancer - father  Cerebrovascular disease - father  CAD - mother, brothers  Hyperlipidemia - mother  Hypertension - mother  Thyroid disorder - mother  Factor V mutation - brother, sister  Crohn's - brother, son  Kidney disorder - brother  Ehler's danlos syndrome -  "daughter  Pancreatic cancer - sister  Ankylosing spondylitis - son  DVT - brother, sister  Macular degeneration - mother    Social History:  Patient is unaccompanied in the ED.  Patient has history of tobacco use, not current.  Patient has history of alcohol use, not current.  PCP: Lexy Escamilla     Physical Exam     Patient Vitals for the past 24 hrs:   BP Temp Temp src Pulse Resp SpO2 Height Weight   02/07/23 1748 (!) 176/99 -- -- 69 -- -- -- --   02/07/23 1636 (!) 162/99 -- -- 74 -- -- -- --   02/07/23 1445 (!) 170/102 97.7  F (36.5  C) Oral 88 18 98 % 1.613 m (5' 3.5\") 83.9 kg (185 lb)        Physical Exam  Constitutional: Patient is well appearing. No distress.  Head: Atraumatic.  Eyes: Conjunctivae and EOM are normal. No scleral icterus.  Neck: Normal range of motion. Neck supple.   Cardiovascular: Normal rate, regular rhythm, normal heart sounds and intact distal perfusion.   Pulmonary/Chest: Breath sounds normal. No respiratory distress.  Abdominal: Soft. Bowel sounds are normal. No distension. No tenderness. No rebound or guarding.   Musculoskeletal: Normal range of motion. No edema or tenderness.   Neurological: Alert and orientated to person, place, and time. No observable focal neuro deficit  Skin: Warm and dry. No rash noted. Not diaphoretic.     Emergency Department Course   ECG:  ECG results from 02/07/23   EKG 12-lead, tracing only     Value    Systolic Blood Pressure     Diastolic Blood Pressure     Ventricular Rate 77    Atrial Rate 77    NM Interval 178    QRS Duration 90        QTc 439    P Axis -15    R AXIS 8    T Axis 34    Interpretation ECG      Sinus rhythm  Normal ECG  When compared with ECG of 29-JAN-2023 18:42,  Vent. rate has decreased BY  71 BPM         Imaging:  XR Chest 2 Views   Final Result   IMPRESSION: Negative chest.      CT Head w/o Contrast   Final Result   IMPRESSION:   1.  No CT finding of a mass, hemorrhage or focal area suggestive of acute infarct.   2.  Mild " age-related changes.         Report per radiology    Laboratory:  Labs Ordered and Resulted from Time of ED Arrival to Time of ED Departure   BASIC METABOLIC PANEL - Normal       Result Value    Sodium 137      Potassium 4.2      Chloride 100      Carbon Dioxide (CO2) 26      Anion Gap 11      Urea Nitrogen 17.8      Creatinine 0.86      Calcium 8.9      Glucose 92      GFR Estimate 72     TROPONIN T, HIGH SENSITIVITY - Normal    Troponin T, High Sensitivity 6     CBC WITH PLATELETS AND DIFFERENTIAL    WBC Count 7.4      RBC Count 4.25      Hemoglobin 12.4      Hematocrit 37.8      MCV 89      MCH 29.2      MCHC 32.8      RDW 13.3      Platelet Count 309      % Neutrophils 67      % Lymphocytes 20      % Monocytes 9      % Eosinophils 3      % Basophils 1      % Immature Granulocytes 0      NRBCs per 100 WBC 0      Absolute Neutrophils 5.0      Absolute Lymphocytes 1.5      Absolute Monocytes 0.7      Absolute Eosinophils 0.2      Absolute Basophils 0.1      Absolute Immature Granulocytes 0.0      Absolute NRBCs 0.0          Procedures   none    Emergency Department Course & Assessments:       Interventions:  Medications - No data to display     Independent Interpretation (X-rays, CTs, rhythm strip):  I independently reviewed the patient's X-ray.     Consultations/Discussion of Management or Tests:  none        Social Determinants of Health affecting care:   History of alcohol and tobacco use, not current.     Assessments:  1708 I obtained history and examined the patient as noted above.    Disposition:  The patient was discharged to home.     Impression & Plan    CMS Diagnoses: None      Medical Decision Making:  Jeanie Pavon is a 72 year old female who presents for evaluation of elevated blood pressure.  There is history of hypertension in the past.  The workup here is negative and the patient does not have any clinical, laboratory, ecg or historical signs of end-organ dysfunction.  There is no signs of  hypertensive emergency or urgency.  Supportive outpatient management is therefore indicated with close follow-up of primary care physician.  Given data obtainedencouraged serial blood pressure monitoring at home to aid primary in decision making regarding hypertension.      Diagnosis:    ICD-10-CM    1. Hypertension, unspecified type  I10            Discharge Medications:  New Prescriptions    No medications on file        Scribe Disclosure:  I, Katya Vargas, am serving as a scribe at 5:27 PM on 2/7/2023 to document services personally performed by Tyree Paez MD based on my observations and the provider's statements to me.    2/7/2023   Tyree Paez MD Stevens, Andrew C, MD  02/07/23 1804

## 2023-02-07 NOTE — ED TRIAGE NOTES
"Pt presents for evaluation of HTN. Checked BP yesterday and is was 210s/110s, today 165/99. Has been having HTN intermittently. Has been feeling a \"fullness\" in the head since. Seen on 1/239/23 for SVT which required medication. Wore a Holter monitor for 24 hrs, then was told to start HCTZ and losartan five days ago. Denies any vision changes, numbness tingling or weakness.    "

## 2023-02-07 NOTE — ED NOTES
"Rapid Assessment Note    History:   Jeanie Pavon is a 72 year old female who presents with concern for persistent hypertension and palpitations/rapid heart beat. Patient was recently treated for \"SVT\" and hypertension and started on losartan and hydrochlorothiazide. She has noted BPs ranging from 160-200 at home. She reports mild head pressure when the BP is elevated but otherwise no other new symptoms. No severe headache, neck pain, chest pain, shortness of breath, leg swelling.    Exam:   General:  Alert, interactive  Cardiovascular:  Well perfused  Lungs:  No respiratory distress, no accessory muscle use  Neuro:  Moving all 4 extremities  Skin:  Warm, dry  Psych:  Normal affect      Plan of Care:   I evaluated the patient and developed an initial plan of care. I discussed this plan and explained that I, or one of my partners, would be returning to complete the evaluation.         2/7/2023  [employer]    Portions of this medical record were completed by a scribe. UPON MY REVIEW AND AUTHENTICATION BY ELECTRONIC SIGNATURE, this confirms (a) I performed the applicable clinical services, and (b) the record is accurate.        Yaya Espinoza MD  02/07/23 1557    "

## 2023-02-08 LAB
ATRIAL RATE - MUSE: 77 BPM
DIASTOLIC BLOOD PRESSURE - MUSE: NORMAL MMHG
INTERPRETATION ECG - MUSE: NORMAL
P AXIS - MUSE: -15 DEGREES
PR INTERVAL - MUSE: 178 MS
QRS DURATION - MUSE: 90 MS
QT - MUSE: 388 MS
QTC - MUSE: 439 MS
R AXIS - MUSE: 8 DEGREES
SYSTOLIC BLOOD PRESSURE - MUSE: NORMAL MMHG
T AXIS - MUSE: 34 DEGREES
VENTRICULAR RATE- MUSE: 77 BPM

## 2023-06-11 ENCOUNTER — HEALTH MAINTENANCE LETTER (OUTPATIENT)
Age: 73
End: 2023-06-11

## 2023-08-25 ENCOUNTER — APPOINTMENT (OUTPATIENT)
Dept: URBAN - METROPOLITAN AREA CLINIC 257 | Age: 73
Setting detail: DERMATOLOGY
End: 2023-08-28

## 2023-08-25 DIAGNOSIS — L57.8 OTHER SKIN CHANGES DUE TO CHRONIC EXPOSURE TO NONIONIZING RADIATION: ICD-10-CM

## 2023-08-25 DIAGNOSIS — H00.1 CHALAZION: ICD-10-CM

## 2023-08-25 DIAGNOSIS — D22 MELANOCYTIC NEVI: ICD-10-CM

## 2023-08-25 DIAGNOSIS — L82.1 OTHER SEBORRHEIC KERATOSIS: ICD-10-CM

## 2023-08-25 DIAGNOSIS — D18.0 HEMANGIOMA: ICD-10-CM

## 2023-08-25 DIAGNOSIS — L81.4 OTHER MELANIN HYPERPIGMENTATION: ICD-10-CM

## 2023-08-25 DIAGNOSIS — Z71.89 OTHER SPECIFIED COUNSELING: ICD-10-CM

## 2023-08-25 DIAGNOSIS — L57.0 ACTINIC KERATOSIS: ICD-10-CM

## 2023-08-25 PROBLEM — H00.19 CHALAZION UNSPECIFIED EYE, UNSPECIFIED EYELID: Status: ACTIVE | Noted: 2023-08-25

## 2023-08-25 PROBLEM — H00.12 CHALAZION RIGHT LOWER EYELID: Status: ACTIVE | Noted: 2023-08-25

## 2023-08-25 PROBLEM — D18.01 HEMANGIOMA OF SKIN AND SUBCUTANEOUS TISSUE: Status: ACTIVE | Noted: 2023-08-25

## 2023-08-25 PROBLEM — D22.5 MELANOCYTIC NEVI OF TRUNK: Status: ACTIVE | Noted: 2023-08-25

## 2023-08-25 PROCEDURE — 17000 DESTRUCT PREMALG LESION: CPT

## 2023-08-25 PROCEDURE — OTHER LIQUID NITROGEN: OTHER

## 2023-08-25 PROCEDURE — OTHER COUNSELING: OTHER

## 2023-08-25 PROCEDURE — OTHER MIPS QUALITY: OTHER

## 2023-08-25 PROCEDURE — 99203 OFFICE O/P NEW LOW 30 MIN: CPT | Mod: 25

## 2023-08-25 ASSESSMENT — LOCATION ZONE DERM
LOCATION ZONE: TRUNK
LOCATION ZONE: EYELID
LOCATION ZONE: FACE
LOCATION ZONE: NOSE

## 2023-08-25 ASSESSMENT — LOCATION DETAILED DESCRIPTION DERM
LOCATION DETAILED: LEFT SUPERIOR MEDIAL UPPER BACK
LOCATION DETAILED: LEFT MEDIAL UPPER BACK
LOCATION DETAILED: RIGHT MEDIAL INFERIOR EYELID
LOCATION DETAILED: LEFT SUPERIOR CENTRAL MALAR CHEEK
LOCATION DETAILED: NASAL DORSUM

## 2023-08-25 ASSESSMENT — LOCATION SIMPLE DESCRIPTION DERM
LOCATION SIMPLE: LEFT CHEEK
LOCATION SIMPLE: RIGHT INFERIOR EYELID
LOCATION SIMPLE: NOSE
LOCATION SIMPLE: LEFT UPPER BACK

## 2023-08-25 NOTE — HPI: FULL BODY SKIN EXAMINATION
What Type Of Note Output Would You Prefer (Optional)?: Bullet Format
What Is The Reason For Today's Visit?: Full Body Skin Examination
What Is The Reason For Today's Visit? (Being Monitored For X): concerning skin lesions on an annual basis
Additional History: Patient has no lesions changing in size/color, but would like reassurance on various lesions throughout body.

## 2023-08-25 NOTE — PROCEDURE: COUNSELING
Detail Level: Generalized
Detail Level: Detailed
Patient Specific Counseling (Will Not Stick From Patient To Patient): Recommended f/u w/ Ophthalmologist for removal

## 2023-10-29 ENCOUNTER — HEALTH MAINTENANCE LETTER (OUTPATIENT)
Age: 73
End: 2023-10-29

## 2024-08-04 ENCOUNTER — HEALTH MAINTENANCE LETTER (OUTPATIENT)
Age: 74
End: 2024-08-04

## 2024-08-27 ENCOUNTER — ALLIED HEALTH/NURSE VISIT (OUTPATIENT)
Dept: RESEARCH | Facility: CLINIC | Age: 74
End: 2024-08-27
Payer: COMMERCIAL

## 2024-08-27 VITALS
BODY MASS INDEX: 31.68 KG/M2 | DIASTOLIC BLOOD PRESSURE: 93 MMHG | WEIGHT: 178.79 LBS | HEART RATE: 77 BPM | RESPIRATION RATE: 18 BRPM | HEIGHT: 63 IN | SYSTOLIC BLOOD PRESSURE: 142 MMHG | OXYGEN SATURATION: 97 %

## 2024-08-27 DIAGNOSIS — Z00.6 EXAMINATION OF PARTICIPANT OR CONTROL IN CLINICAL RESEARCH: Primary | ICD-10-CM

## 2024-08-27 PROCEDURE — 99207 PR NO CHARGE-RESEARCH SERVICE: CPT

## 2024-08-27 NOTE — PROGRESS NOTES
"  [place] Screening Study Note  Study Description: The purpose of this study is to explore potential relationships between physiologic parameters collected from sensor data with physiological changes potentially induced by the administration of the COVID-19 vaccine.       Subject ID:      Demographic Info  Jeanie Pavon   1950          74 year old    SCREENING   Sex: Female   Pregnancy Screening:   Surgically Sterile: No  Over 55 years of age and have not had a menstrual cycle for >2 years: Yes  Last Menstrual Period Date: UN-UN-2015          Multi Racial?: No; Primary: White   Ethnicity: Ethnicity: Not  or      Medical/Surgical Conditions:   Has the subject experienced any relevant past and/or concomitant Medical History (e.g., chronic conditions such as hypertension, cardiovascular disease, stroke, diabetes, kidney disease, peripheral arterial disease, Raynaud's syndrome, and any relevant surgical history?  Yes  Past Medical History:   Diagnosis Date    Hypertension 21APR2017, ONGOING    Hyperlipidemia                                                                                            21APR2017, ONGOING       Add 'Ongoing' or end date to medical condition as applicable    Concomitant Medications:      Review of your medicines       Medication Name (Generic) Class Start Date Ongoing? Dose Unit Frequency Route Indication   Losartan  Other 29-JAN-2023 Yes 25  mg QD Oral Con Med Cond: Hypertension   Hydrochlorothiazide Other 08-JUN-2022 Yes 6.25 mg QD QD Con Med Cond: Hypertension   Rosuvastatin Other 25-JAN-2023 Yes 5 mg QD QD Con Med Cond: Hyperlipidemia     Allergies:   Does the subject have any known allergies to medications, food, a vaccine component, or latex? No  Allergies   Allergen Reactions    None                     Subject Characteristics     Vitals  BP (!) 142/93   Pulse 77   Resp 18   Ht 1.6 m (5' 3\")   Wt 81.1 kg (178 lb 12.7 oz)   SpO2 97%   BMI 31.67 kg/m   "       Novak Scale:  Wrist Circumference: Study Watch Wrist Preferred Watch Wrist Dominant Hand Watch Band Tightness:   2 16 cm Left Left Right Secure      Watch Size Preference: 41mm    ENROLLMENT    Was the visit performed? Yes   Date of Enrollment: 27-AUG-2024. All procedures below occurred on this day.    Site Zip Code: Site Time Zone:  Site Location: Protocol Assigned: Eligibility Confirmed:   [id number] [facility]  Protocol A (COVID) Yes   Plan for Study Kit #1 Delivery: Given to Participant On-Site     [place] Device Accountability Prepped/Dispensed  Prepped & Dispensed Study Kit #1:  All Study Devices included? Yes (including Study Watch, Study Phone, Ambient Sensor, Oral Thermometer and Charging Accessories)  Is this a Replacement Kit? No    Study Phone  ID HSA Research ID [place] ID    [id number]     Study Watch  ID Model  Band Type    [id number] Series 9 Sport Loop     Was Study Kit #1 Shipped to the Participant? No  Were all expected devices received? Yes  Were there Device Issues? No  Was the Study Kit Replaced? No    All devices listed above were dispensed to the participant. Device ID were confirmed prior to dispensation.      Participant was thoroughly educated on study procedure and device care, staff highlighted the importance of compliance to study procedure. All questions and concerns were addressed, and informed participant to contact study coordinators for any questions.     Adverse Events Summary:*   Were any Adverse Events (AEs) experienced? No    Protocol Deviation Summary:*   Were there any Protocol Deviations?: No    *If Yes, please complete corresponding form.    Concomitant Medications:  As screening and enrollment appointments occurred on the same day, please refer to the concomitant medications documented above.        27-AUG-2024   Ira Ashley RN

## 2024-08-27 NOTE — PROGRESS NOTES
[place] Study Consent    Study Description: The purpose of this study is to explore potential relationships between physiologic parameters collected from sensor data with physiological changes potentially induced by the administration of the COVID-19 vaccine.    Jeanie Pavon a 74 year old female, was on-site today at [facility] to discuss participation for [place] (-[id number])       The consent form was reviewed with the patient.     The review of the study included:  Study Purpose      Participant Duration, Responsibilities & Expectations    Study Data and Devices    Benefits and Risks of Participation    Compensation and Costs of Participation    Coded Study Data  Voluntary Participation    Study Restrictions  Confidentiality Obligations/Privacy-Related Risks   Injury, Legal, and Data Rights    Authorization to Use and Disclose Your Protected Health Information    Protocol Version: 3.0   Principle Investigator: Germain Wilkerson MD    Subject Number: [id number]      The subject was queried in regards to her willingness to continue and her questions were answered to her satisfaction. The patient has given her agreement to volunteer and participate in the above noted study.     The eConsent and HIPAA form version (Version 3.0 Date 09-AUG-2024) was Signed: signed on  27-AUG-2024 with the Clinical Research Unit of [facility].     A copy of the [place] consent will be placed in subject's medical record. A copy of the consent form was given to the subject today.    Study data is directly entered into Epic and clinovo per protocol. No study procedures were done prior to Jeanie Pavon providing informed consent.       27-AUG-2024    Ira Ashley RN

## 2024-08-27 NOTE — PROGRESS NOTES
"  [place] Study Physical Exam  Study Description: The purpose of this study is to explore potential relationships between physiologic parameters collected from sensor data with physiological changes potentially induced by the administration of the COVID-19 vaccine.     Medical History Reviewed? Yes    Physical Examination  For abnormal findings, please evaluate if the finding is Clinically Significant (by 'CS') or Not Clinically Significant (by 'NCS')  General Appearance   Normal  Head and Neck   Normal  Lungs     Normal  Cardiovascular   Normal  Abdomen    Normal  Musculoskeletal/Extremities  Normal   Lymph Nodes    Normal  Skin     Normal  Neurological    Normal    Tremor (If present document)  Absent    Vitals:    08/27/24 0926   BP: 142/93   Pulse: 77   Resp: 18   SpO2: 97%   Weight: 81.1 kg (178 lb 12.7 oz)   Height: 1.6 m (5' 3\")              Immunization History   Administered Date(s) Administered    COVID-19 12+ (2023-24) (Pfizer) 10/09/2023    COVID-19 Bivalent 12+ (Pfizer) 09/13/2022    COVID-19 MONOVALENT 12+ (Pfizer) 03/04/2021, 03/25/2021, 10/12/2021    COVID-19 Monovalent 12+ (Pfizer 2022) 04/22/2022       Have you had any serious issues with previous Covid-19 immunizations? No  COVID Vaccine Screening   Have you received a dose of the Covid-19 vaccine before?   Yes, Pfizer  Date of most recent Covid-19 vaccine dose:     09-October-2023   Do you currently have a health condition or are undergoing    treatment that makes you moderately to severely immunocompromised?* No  Have you ever had an allergic reaction to a Covid vaccine?**  No  Have you ever had an allergic reaction to another vaccine or injectable  medication?         No  Have you ever felt dizzy or faint before, during or after a shot?   No    *Ex: treatment of cancer, HIV, organ transplant recipient, immunosuppressive therapy, etc.     **This would include a severe allergic reaction (e.g., anaphylaxis that required treatment with epinephrine " or caused you to go to the hospital. It would also include an allergic reaction that caused hives, swelling, or respiratory distress, including wheezing)    Is this subject eligible to receive a Covid-19 vaccine? Yes    27-AUG-2024    Mara Buckley PA-C

## 2024-08-28 ENCOUNTER — TELEPHONE (OUTPATIENT)
Dept: RESEARCH | Facility: CLINIC | Age: 74
End: 2024-08-28
Payer: COMMERCIAL

## 2024-08-28 NOTE — TELEPHONE ENCOUNTER
[place] Unscheduled Visit  Study Description: The purpose of this study is to explore potential relationships between physiologic parameters collected from sensor data with physiological changes potentially induced by the administration of the COVID-19 vaccine.       Subject ID:      Type of Visit: Phone Call  Visit Date: 28-AUG-2024    Reason for Unscheduled Visit: Compliance/Technical Assistance: Wifi Issue. Returning call from yesterday 27AUG2024 regarding Wifi set-up.  Participant issue resolved last night on her own. No further issues or questions.    Adverse Events Summary:*   Were any Adverse Events (AEs) experienced? No    Protocol Deviation Summary:*   Were there any Protocol Deviations?: No    *If Yes, please complete corresponding form.    Concomitant Medications Summary:    Has the subject taken any medications within 30 days prior to signing the informed consent and/or during the study? (Have they started any new medications?) No    28-AUG-2024  Ira Ashley RN

## 2024-09-13 ENCOUNTER — TELEPHONE (OUTPATIENT)
Dept: RESEARCH | Facility: CLINIC | Age: 74
End: 2024-09-13
Payer: COMMERCIAL

## 2024-09-13 NOTE — TELEPHONE ENCOUNTER
I called Jeanie today, asking her to verify if she had any first degree relatives with Chron's disease, as this information is needed to verify patient eligibility in [place] research study.    She verified that her brother does indeed have Chron's, but that her son does not, as previously documented.    I thanked her for the information, and let her know our team would be contacting her with further details about the study.    Mara Buckley PA-C

## 2024-09-13 NOTE — TELEPHONE ENCOUNTER
After obtaining verification that Jeanie's brother has Chron's disease, she will no longer be eligible to participate in the [place] study.    I called her immediately to inform her of this. I thanked her for her participation so far and that she would be compensated for her participation, but also informed her that she should stop obtaining data and completing surveys for the study at the time of this notification.  She will be able to return the devices and all other equipment at 10 am on Monday, September 16, 2024.    She appreciated the information. I assured her that she would be on our list to be contacted again for other opportunities with our department.  She appreciated the call and confirmed she will see us on Monday.    Mara Buckley PA-C

## 2024-09-16 ENCOUNTER — ALLIED HEALTH/NURSE VISIT (OUTPATIENT)
Dept: RESEARCH | Facility: CLINIC | Age: 74
End: 2024-09-16

## 2024-09-16 DIAGNOSIS — Z00.6 EXAMINATION OF PARTICIPANT OR CONTROL IN CLINICAL RESEARCH: Primary | ICD-10-CM

## 2024-09-16 PROCEDURE — 99207 PR NO CHARGE NURSE ONLY: CPT

## 2024-09-16 NOTE — PROGRESS NOTES
[place] End of Study Note  -Including Device Accountability Returned and Subject Disposition    Study Description: The purpose of this study is to explore potential relationships between physiologic parameters collected from sensor data with physiological changes potentially induced by the administration of the COVID-19 vaccine.       Subject ID:        Was the visit performed?  Yes   Was Study Exit Survey Completed on the study phone?: Yes    Subject Disposition:  Did the subject complete the study? No   If no, Why? After enrollment, participant no longer meets I/E criteria of the study (Specify): After further investigation of the patients Electronic Medical Record it was determined that CBT Exclusion criteria #5 was meet and the participant was not eligible for enrollment. At the time of the screening appointment the subject was verbally asked this criteria and denied any 1st degree relatives having such conditions.     Which Visit did the participant exit from the study? Unscheduled Visit  Study Completion Date: 16-SEP-2024      [place] Device Accountability Returned Form    Was the Device Kit Returned? Yes   Date Device Kit Returned:  16-SEP-2024   Were There Device Issues?  No   Was the Phone Returned?   Returned Phone ID: Yes  [id number]   Was the Watch Returned?   Returned Watch ID: Yes  [id number]   Was the Riva Returned?   Returned Riva ID: No  [id number]   Were All Chargers and Accessories Returned?  Yes        Adverse Events Summary:*   Were any Adverse Events (AEs) experienced? No    Protocol Deviation Summary:*   Were there any Protocol Deviations?: No    *If Yes, please complete corresponding form.    Concomitant Medications Summary:    Has the subject taken any medications within 30 days prior to signing the informed consent and/or during the study? Yes, no meds have changed since the start of screening, please see the screening note for complete list of medications.       16-SEP-2024   Valentino MONTEMAYOR  [place]

## 2024-09-18 NOTE — PROGRESS NOTES
[place] Protocol Deviation Log  Study Description: The purpose of this study is to explore potential relationships between physiologic parameters collected from sensor data with physiological changes potentially induced by the administration of the COVID-19 vaccine.       Subject ID:      Description of Protocol Deviation: The subject was inappropriately enrolled in the study. At her screening and enrollment appointment she did not disclose first degree family members who have a history of crohn's disease. One of the Sub-Is did speak to the participant on the phone and she did finally admit to the medical history of these family members. She was then told to halt study procedures and return her study devices.       Date Deviation was noted Date of Protocol Deviation Type of Deviation   13-SEP-2024 27-AUG-2024 Eligibility Criteria        Protocol Deviation Severity:  Major  Late Date Entry Note: Severity of protocol deviation was corrected to be in alignment with PDMP. -LZ 30-Sep-2024   Protocol Deviation Level: Subject Level   Additional Action Required?  (Describe Action Taken) Yes: Subject was early terminated/withdrawn from the study.    Was Deviation Reported to IRB? Yes: 18-SEP-2024   Additionally, upon review of the participant's extended medical record, an allergy to codeine was found. Please allow this documentation to reflect the source of this information that has now been transcribed to the EDC.   As the subject did not actually meet exclusion criteria related to ingestible temperature sensor number 5 when they were enrolled, the  for this criteria should read 'yes'. It is currently documented as a 'no'. Since the PI has signed off on the I/E criteria in EPIC, the note cannot be addended to correct this entry. Please allow this addendum to reflect the change in data.  Additionally, during the original assessment of Inclusion/Exclusion criteria ingestible temperature sensor related  exclusion criteria number 8 was erroneously marked as 'yes' but the . Via review with the subject and extensive medical chart review, the subject has not in fact had any previous gastrointestinal surgery. Please allow this addendum documentation to reflect the  error correction for exclusion criteria number 8, which should be 'no'.  18-SEP-2024   Mariaelena Hudson

## 2024-10-03 DIAGNOSIS — E78.49 FAMILIAL HYPERLIPIDEMIA: Primary | ICD-10-CM

## 2024-10-03 DIAGNOSIS — I10 BENIGN ESSENTIAL HYPERTENSION: ICD-10-CM

## 2024-10-10 ENCOUNTER — OFFICE VISIT (OUTPATIENT)
Dept: CARDIOLOGY | Facility: CLINIC | Age: 74
End: 2024-10-10
Payer: COMMERCIAL

## 2024-10-10 ENCOUNTER — LAB (OUTPATIENT)
Dept: LAB | Facility: CLINIC | Age: 74
End: 2024-10-10
Payer: COMMERCIAL

## 2024-10-10 VITALS
HEIGHT: 63 IN | WEIGHT: 181.1 LBS | SYSTOLIC BLOOD PRESSURE: 120 MMHG | BODY MASS INDEX: 32.09 KG/M2 | DIASTOLIC BLOOD PRESSURE: 79 MMHG

## 2024-10-10 DIAGNOSIS — I10 BENIGN ESSENTIAL HYPERTENSION: ICD-10-CM

## 2024-10-10 DIAGNOSIS — E78.49 FAMILIAL HYPERLIPIDEMIA: ICD-10-CM

## 2024-10-10 DIAGNOSIS — E78.49 FAMILIAL HYPERLIPIDEMIA: Primary | ICD-10-CM

## 2024-10-10 LAB
CHOLEST SERPL-MCNC: 291 MG/DL
FASTING STATUS PATIENT QL REPORTED: ABNORMAL
HDLC SERPL-MCNC: 62 MG/DL
LDLC SERPL CALC-MCNC: 195 MG/DL
NONHDLC SERPL-MCNC: 229 MG/DL
TRIGL SERPL-MCNC: 171 MG/DL

## 2024-10-10 PROCEDURE — 86141 C-REACTIVE PROTEIN HS: CPT | Performed by: NURSE PRACTITIONER

## 2024-10-10 PROCEDURE — 99000 SPECIMEN HANDLING OFFICE-LAB: CPT | Performed by: PATHOLOGY

## 2024-10-10 PROCEDURE — 80061 LIPID PANEL: CPT | Performed by: PATHOLOGY

## 2024-10-10 PROCEDURE — 93000 ELECTROCARDIOGRAM COMPLETE: CPT | Performed by: INTERNAL MEDICINE

## 2024-10-10 PROCEDURE — 36415 COLL VENOUS BLD VENIPUNCTURE: CPT | Performed by: PATHOLOGY

## 2024-10-10 PROCEDURE — 99215 OFFICE O/P EST HI 40 MIN: CPT | Performed by: NURSE PRACTITIONER

## 2024-10-10 PROCEDURE — 82043 UR ALBUMIN QUANTITATIVE: CPT | Performed by: NURSE PRACTITIONER

## 2024-10-10 RX ORDER — EZETIMIBE 10 MG/1
10 TABLET ORAL DAILY
Qty: 90 TABLET | Refills: 3 | Status: SHIPPED | OUTPATIENT
Start: 2024-10-10

## 2024-10-10 NOTE — PROGRESS NOTES
Sheikh Test Results    WALKING BLOOD PRESSURE RESPONSE (3 minute, 5 MET level walk)   Pre BP: 130/70 mmHg  3 min BP: 160/60 mmHg  1 min post BP: 130/85 mmHg    Pre HR: 87 bpm  3 min HR: 130 bpm  1 min post HR: 70 bpm     Test results: Walking blood pressure response for 3 minutes activity is in borderline range with increase in blood pressure from the baseline 30 points.      RETINAL VASCULAR ASSESSMENT   Left Eye Abnormality:  none  AV Ratio: 0.8    Right Eye Abnormality:  none  AV Ratio: 0.8     Retinal Assessment:  normal    ABDOMINAL AORTA ULTRASOUND (< 2.5 normal, borderline 2.5-2.9, abnormal > 3)   SupraIliac 1.83 cm    SupraRenal 1.62 cm    InfraRenal Proximal 1.85 cm    InfraRenal Distal 2.0 cm      Abdominal Aorta Assessment:  normal    LEFT VENTRICULAR ULTRASOUND MEASUREMENTS (adjusted for BSA)  LVIDD 46.4 mm   Septa 9 mm   Posterior 8.7 mm     Left Ventricular US Assessment:  normal    Carotid Artery IMT measurements report and plaques in the small area examined:   Left IMT 0.782 mm  Plaques Small heterogeneous plaques in left bulb area size 2.5-2.8 mm    Right IMT 0.800 mm  Plaques Small heterogeneous plaques in right bulb area  size 1.0 - 2.8 mm     Test results: Carotid arteries wall thickening is in abnormal range due to plaque formations present.     ECG (see tracing):  normal sinus rhythm    Arterial Elasticity per age and gender (see printout):   C1 11.6 mL/mmHg x 10  normal   C2 2.5 mL/mmHg x 100 abnormal   Supine blood pressure: 143/85 mmHg     Test results: Arterial elasticity of the large size arteries is in normal range. Arterial elasticity of the small size arteries is in abnormal range.     Sheikh disease score:5    Alis Toure

## 2024-10-10 NOTE — PROGRESS NOTES
[facility] Cardiovascular Disease Prevention - Exam Note    Active Problems   Patient Active Problem List    Diagnosis Date Noted    Dermatochalasis of both upper eyelids 02/22/2019     Priority: Medium    Brow ptosis 02/22/2019     Priority: Medium    Hallux limitus, unspecified laterality 08/21/2017     Priority: Medium    Familial hyperlipidemia      Priority: Medium     pt reports onset two years ago but labs from 2005 elevated LDL      Depression      Priority: Medium    Fatigue      Priority: Medium       Reason For Visit   Patient here for [facility] early detection of atherosclerosis and CVD exam.    Pain Evaluation  Current history of pain associated with this visit is: denied    Cardiac risk factors:    + age      - smoking      + elevated BMI        + Family history CVD         - Diet           + Hypertension    HPI   Jeanie Pavon is a 74 year old year old female with a history of hyperlipidemia, hypertension, meniere's disease, depression and a history of alcohol abuse. She quit drinking alcohol 35 years ago. Her primary care provider is LENARD Hernandez at [facility]. Her family history of CV disease includes  her mother having PCI/LUDWIN at age 60, father having a stroke at age 88, 3 brothers with hypertension, 2 other brothers with CAD at age 62, 67 and 77. CAC score in 2017 of 27, 50-75th percentile.   She has tried to take atorvastatin and rosuvastatin, can't remember why, but has stopped both of those medications. he takes 25 mg of losartan and 6.25 mg of hydrochlorothiazide.  She was seen in [facility] CV Prevention clinic in 2022, score 5, 2017 score 9.   She is retired.  She worked in a high school on art projects.  She continues to experience palpitations occasionally, lasting for seconds.  In clinic today she denies chest pain at rest, with activity, while sleeping, SOB at rest, with activity or while sleeping, lightheadedness, lower leg edema, calf cramps, indigestion,  headaches or issues with her memory.     Nutrition assessment per patient report:   Foods with fat/cholesterol (fried foods, fatty meats, junk food):   1x/month    Fruits and vegetables (  cup cooked, 1 cup raw):   2-4 servings of vegetables/day, 1-3 servings of fruit/day  Caffeine (1 cup coffee, soda, etc):   2 shots of expresso in am  Alcohol servings (12 oz. beer, 4 oz. wine, 1  oz. in mixed drink):   has not had an alcoholic drink for 35  Special dietary habits:   limiting salt intake  Typical breakfast:  Cream cheese on bagel                 Lunch: grazed during the day, fruit                 Dinner: salad, vegetable, limiting red meat                 Snacks: cheese and fruit                 Drinks:  water  Activity  Patient is active gardening and walking 3-4 blocks     Sleep pattern: good    Laboratory Results Review  We discussed laboratory results today including lipids targets and how foods influence cholesterol.    Weight  Her perceived healthy weight is 150-155 pounds.  A normal BMI of 25 is equal to 32.08 pounds.  The current BMI of 32.08 is high-risk range.  A weight reduction speed of 1-2 lbs per month for women is recommended.    PMH   Past Medical History:   Diagnosis Date    Agatston coronary artery calcium score less than 100 2017    score of 27, 50-75% range    Benign essential hypertension     Depression     Familial hyperlipidemia     pt reports onset two years ago but labs from 2005 elevated LDL    Fatigue     History of alcohol abuse     no alcohol for 20 years    Meniere disease 1987    coil in right inner ear, endolymphatic shunt       PSH  Past Surgical History:   Procedure Laterality Date    DECOMPRESS, ENHANCE ENDOLYMPHATIC SAC Right 1987       Current Meds   Current Outpatient Medications   Medication Sig Dispense Refill    buPROPion (WELLBUTRIN XL) 150 MG 24 hr tablet   1    cholecalciferol 25 MCG (1000 UT) TABS Take 1,000 Units by mouth      FLUoxetine (PROZAC) 20 MG capsule TAKE  THREE CAPSULES BY MOUTH DAILY      hydrochlorothiazide (HYDRODIURIL) 12.5 MG tablet Take 1 Tablet (12.5 mg) by mouth daily.      losartan (COZAAR) 25 MG tablet Take 25 mg by mouth daily         Allergies      Allergies   Allergen Reactions    Alcohol Other (See Comments)     Sober 28 years    Codeine Anxiety and Palpitations       Family Hx   Family History   Problem Relation Age of Onset    Coronary Artery Disease Mother 60        s/pMI at age 60, s/p CABG X 2, CHF    Kidney Disease Mother         cause of death    Macular Degeneration Mother     Cerebrovascular Disease Father 88    Pancreatic Cancer Sister         cause of death    Deep Vein Thrombosis Sister     Coronary Artery Disease Brother 77        s/p CABG at age 77    Hypertension Brother     Deep Vein Thrombosis Brother     Hypertension Brother     Hypertension Brother     Crohn's Disease Brother     Hypertension Brother     Hyperlipidemia Brother     Coronary Artery Disease Brother 67        CAD    Hyperlipidemia Brother     Coronary Artery Disease Brother 62        s/p MI, s/p cardiac arrest (smoker)    Coronary Artery Disease Maternal Grandfather 60    Heart Disease Paternal Grandfather     Sarah-Danlos syndrome Daughter     Ankylosing Spondylitis Son     Glaucoma No family hx of        Social History    She is      Tobacco History  History   Smoking Status    Former    Packs/day: 2.00    Years: 20.00    Types: Cigarettes    Start date: 1967    Quit date: 1987   Smokeless Tobacco    Never       ROS  General:  WDWN in NAD  EENT:  Denies visual disturbances, epistaxis, sore throat  Respiratory:  Denies SOB, cough, sputum production  Cardiovascular:  see HPI  GI:  Denies nausea, vomiting, hematemesis, melena  :  denies hematuria, dysuria  Skin:  Denies rashes, lesions or open wounds  Psych:  Pleasant affect    Vital Signs   There were no vitals taken for this visit.      Waist: 39.5 inches  Hip: 44.5 inches    Physical Exam   In general, the  "patient is a pleasant female in no apparent distress   HEENT: NC/AT, PERRLA, EOMI, sclerae white, not injected. Nares clear, pharynx without erythema or exudate, dentition intact    Neck: No adenopathy, no thyromegaly, carotids +4/4 bilaterally without bruits,  no jugular venous distension   Lungs: Breath sounds clear bilaterally, without crackles, ronchi, or wheezes  Cor: RRR, S1S2 without murmur, rub, click, or gallop, the PMI is in the 5th ICS in the midclavicular line  Abdomen: Soft, nontender, nondistended, BS+ in all 4 quadrants, without hepatomegaly, no aorta or renal artery bruits  Extremities: No clubbing, cyanosis, or edema. DP and PT pulses +2/4 bilaterally    The 10-year ASCVD risk score (Padmaja CAICEDO Jr., et al., 2013) is: 16.1%  Values used to calculate the score:   Age: 74 year old   Sex: female   Is Non- : No   Diabetic: No   Tobacco smoker: No   Systolic Blood Press: 120 mmHg   Is BP treated: Yes   HDL Cholesterol: 62 mg/dL   Total Cholesterol: 291 mg/dL    Recent Labs  Lab Results   Component Value Date    GLC 92 02/07/2023    GLC 99 06/30/2022    GLC 86 04/24/2017      Lab Results   Component Value Date    NTBNP 182 (H) 04/24/2017     No results found for: \"NTBNPI\"   Lab Results   Component Value Date    UCRR 184 06/30/2022    UCRR 83 04/24/2017      Lab Results   Component Value Date    MICROL 15 06/30/2022    MICROL 6 04/24/2017      No results found for: \"MICROALBUMIN\"   Lab Results   Component Value Date    CRP 4.5 04/24/2017      Lab Results   Component Value Date    CHOL 280 (H) 06/30/2022    CHOL 288 (H) 04/24/2017      Lab Results   Component Value Date    TRIG 195 (H) 06/30/2022    TRIG 207 (H) 04/24/2017      Lab Results   Component Value Date    HDL 54 06/30/2022    HDL 55 04/24/2017      Lab Results   Component Value Date     (H) 06/30/2022     (H) 04/24/2017      No results found for: \"VLDL\"   No results found for: \"CHOLHDLRATIO\"  Lab Results "   Component Value Date    NHDL 226 (H) 06/30/2022    NHDL 233 (H) 04/24/2017 2017 Calciuim screening  CONCLUSION:   1. CORONARY ARTERY CALCIUM SCORE: 27.  PERCENTILE 50-75th.   2. CORONARY ARTERY ATHEROSCLEROSIS IS PRESENT.   3. MODERATE RISK OF CARDIAC DISEASE BASED ON THIS TEST.     Assessment:    Cardiovascular:  Asymptomatic, she is not complaining of chest pain, EKG revealed junctional rhythm, HR 77 bpm, no plaque detected in carotid arteries    Blood Pressure:  She takes 25 mg of losartan and 6.25 mg of hydrochlorothiazide, -120/75-79 mmHg    Lipids:  She does not take a statin medication, intolerant to statin medications, recommend starting 10 mg of Zetia or Repatha. I left her a VM asking if she would be willing to start either medication     Latest Ref Rng 6/30/2022  7:25 AM 6/30/2022  8:02 AM 6/30/2022  8:03 AM 1/29/2023  6:30 PM 1/29/2023  6:45 PM   BP WT  CHOL         Cholesterol <200 mg/dL 280 (H)        HDL Cholesterol >=50 mg/dL 54        LDL Cholesterol Calculated <100 mg/dL 187 (H)        Triglycerides <150 mg/dL 195 (H)           Latest Ref Rng 1/29/2023  6:50 PM 1/29/2023  7:25 PM 1/29/2023  7:30 PM 2/7/2023  2:45 PM 2/7/2023  4:36 PM   BP WT  CHOL         Cholesterol <200 mg/dL        HDL Cholesterol >=50 mg/dL        LDL Cholesterol Calculated <100 mg/dL        Triglycerides <150 mg/dL           Latest Ref Rng 2/7/2023  5:48 PM 8/27/2024  9:26 AM 10/10/2024  1:23 PM   BP WT  CHOL       Cholesterol <200 mg/dL   291 (H)    HDL Cholesterol >=50 mg/dL   62    LDL Cholesterol Calculated <100 mg/dL   195 (H)    Triglycerides <150 mg/dL   171 (H)       Glucose: 92 2023    Sleep pattern:  Sleep hygiene reviewed during clinic visit, handout given to patient    Weight Management: BMI 32.08, recommend consultation with comprehensive weight managment    Return to Clinic: 1-2 years    Health Habit Summary:  Nutrition: Heart Healthy Eating:  most of the time   Exercise:  not regularly  active  Weight:  high-risk range  Tobacco Use:   remote history    This case was presented to Dr. Templeton and Dr. Samaria James during our weekly conference.     60 minutes spent on the date of the encounter doing (chart review/review of outside records/review of test results/interpretation of tests/patient visit/documentation/discussion with other provider(s)   MIREYA Ellison CNP       CC  Patient Care Team:  Lexy Escamilla as PCP - General (Physician Assistant)  Ab Hong APRN CNP as Nurse Practitioner (Cardiovascular Disease)  AB HONG

## 2024-10-10 NOTE — LETTER
10/10/2024      RE: Jeanie Pavon  [address]  [place] [address]       Dear Colleague,    Thank you for the opportunity to participate in the care of your patient, Jeanie Pavon, at the [facility]ARDIOVASC[facility] DISE[facility] PREVENTION [place] at [facility]. Please see a copy of my visit note below.      [facility] for Cardiovascular Disease Prevention - Exam Note    Active Problems   Patient Active Problem List    Diagnosis Date Noted     Dermatochalasis of both upper eyelids 02/22/2019     Priority: Medium     Brow ptosis 02/22/2019     Priority: Medium     Hallux limitus, unspecified laterality 08/21/2017     Priority: Medium     Familial hyperlipidemia      Priority: Medium     pt reports onset two years ago but labs from 2005 elevated LDL       Depression      Priority: Medium     Fatigue      Priority: Medium       Reason For Visit   Patient here for [facility] early detection of atherosclerosis and CVD exam.    Pain Evaluation  Current history of pain associated with this visit is: denied    Cardiac risk factors:    + age      - smoking      + elevated BMI        + Family history CVD         - Diet           + Hypertension    HPI   Jeanie Pavon is a 74 year old year old female with a history of hyperlipidemia, hypertension, meniere's disease, depression and a history of alcohol abuse. She quit drinking alcohol 35 years ago. Her primary care provider is LENARD Hernandez at [facility]. Her family history of CV disease includes  her mother having PCI/LUDWIN at age 60, father having a stroke at age 88, 3 brothers with hypertension, 2 other brothers with CAD at age 62, 67 and 77. CAC score in 2017 of 27, 50-75th percentile.   She has tried to take atorvastatin and rosuvastatin, can't remember why, but has stopped both of those medications. he takes 25 mg of losartan and 6.25 mg of hydrochlorothiazide.  She  was seen in [facility] CV Prevention clinic in 2022, score 5, 2017 score 9.   She is retired.  She worked in a high school on art projects.  She continues to experience palpitations occasionally, lasting for seconds.  In clinic today she denies chest pain at rest, with activity, while sleeping, SOB at rest, with activity or while sleeping, lightheadedness, lower leg edema, calf cramps, indigestion, headaches or issues with her memory.     Nutrition assessment per patient report:   Foods with fat/cholesterol (fried foods, fatty meats, junk food):   1x/month    Fruits and vegetables (  cup cooked, 1 cup raw):   2-4 servings of vegetables/day, 1-3 servings of fruit/day  Caffeine (1 cup coffee, soda, etc):   2 shots of expresso in am  Alcohol servings (12 oz. beer, 4 oz. wine, 1  oz. in mixed drink):   has not had an alcoholic drink for 35  Special dietary habits:   limiting salt intake  Typical breakfast:  Cream cheese on bagel                 Lunch: grazed during the day, fruit                 Dinner: salad, vegetable, limiting red meat                 Snacks: cheese and fruit                 Drinks:  water  Activity  Patient is active gardening and walking 3-4 blocks     Sleep pattern: good    Laboratory Results Review  We discussed laboratory results today including lipids targets and how foods influence cholesterol.    Weight  Her perceived healthy weight is 150-155 pounds.  A normal BMI of 25 is equal to 32.08 pounds.  The current BMI of 32.08 is high-risk range.  A weight reduction speed of 1-2 lbs per month for women is recommended.    PMH   Past Medical History:   Diagnosis Date     Agatston coronary artery calcium score less than 100 2017    score of 27, 50-75% range     Benign essential hypertension      Depression      Familial hyperlipidemia     pt reports onset two years ago but labs from 2005 elevated LDL     Fatigue      History of alcohol abuse     no alcohol for 20 years     Meniere disease 1987    coil  in right inner ear, endolymphatic shunt       PSH  Past Surgical History:   Procedure Laterality Date     DECOMPRESS, ENHANCE ENDOLYMPHATIC SAC Right 1987       Current Meds   Current Outpatient Medications   Medication Sig Dispense Refill     buPROPion (WELLBUTRIN XL) 150 MG 24 hr tablet   1     cholecalciferol 25 MCG (1000 UT) TABS Take 1,000 Units by mouth       FLUoxetine (PROZAC) 20 MG capsule TAKE THREE CAPSULES BY MOUTH DAILY       hydrochlorothiazide (HYDRODIURIL) 12.5 MG tablet Take 1 Tablet (12.5 mg) by mouth daily.       losartan (COZAAR) 25 MG tablet Take 25 mg by mouth daily         Allergies      Allergies   Allergen Reactions     Alcohol Other (See Comments)     Sober 28 years     Codeine Anxiety and Palpitations       Family Hx   Family History   Problem Relation Age of Onset     Coronary Artery Disease Mother 60        s/pMI at age 60, s/p CABG X 2, CHF     Kidney Disease Mother         cause of death     Macular Degeneration Mother      Cerebrovascular Disease Father 88     Pancreatic Cancer Sister         cause of death     Deep Vein Thrombosis Sister      Coronary Artery Disease Brother 77        s/p CABG at age 77     Hypertension Brother      Deep Vein Thrombosis Brother      Hypertension Brother      Hypertension Brother      Crohn's Disease Brother      Hypertension Brother      Hyperlipidemia Brother      Coronary Artery Disease Brother 67        CAD     Hyperlipidemia Brother      Coronary Artery Disease Brother 62        s/p MI, s/p cardiac arrest (smoker)     Coronary Artery Disease Maternal Grandfather 60     Heart Disease Paternal Grandfather      Sarah-Danlos syndrome Daughter      Ankylosing Spondylitis Son      Glaucoma No family hx of        Social History    She is      Tobacco History  History   Smoking Status     Former     Packs/day: 2.00     Years: 20.00     Types: Cigarettes     Start date: 1967     Quit date: 1987   Smokeless Tobacco     Never       ROS  General:   "WDWN in NAD  EENT:  Denies visual disturbances, epistaxis, sore throat  Respiratory:  Denies SOB, cough, sputum production  Cardiovascular:  see HPI  GI:  Denies nausea, vomiting, hematemesis, melena  :  denies hematuria, dysuria  Skin:  Denies rashes, lesions or open wounds  Psych:  Pleasant affect    Vital Signs   There were no vitals taken for this visit.      Waist: 39.5 inches  Hip: 44.5 inches    Physical Exam   In general, the patient is a pleasant female in no apparent distress   HEENT: NC/AT, PERRLA, EOMI, sclerae white, not injected. Nares clear, pharynx without erythema or exudate, dentition intact    Neck: No adenopathy, no thyromegaly, carotids +4/4 bilaterally without bruits,  no jugular venous distension   Lungs: Breath sounds clear bilaterally, without crackles, ronchi, or wheezes  Cor: RRR, S1S2 without murmur, rub, click, or gallop, the PMI is in the 5th ICS in the midclavicular line  Abdomen: Soft, nontender, nondistended, BS+ in all 4 quadrants, without hepatomegaly, no aorta or renal artery bruits  Extremities: No clubbing, cyanosis, or edema. DP and PT pulses +2/4 bilaterally    The 10-year ASCVD risk score (Padmaja MARIFER Jr., et al., 2013) is: 16.1%  Values used to calculate the score:   Age: 74 year old   Sex: female   Is Non- : No   Diabetic: No   Tobacco smoker: No   Systolic Blood Press: 120 mmHg   Is BP treated: Yes   HDL Cholesterol: 62 mg/dL   Total Cholesterol: 291 mg/dL    Recent Labs  Lab Results   Component Value Date    GLC 92 02/07/2023    GLC 99 06/30/2022    GLC 86 04/24/2017      Lab Results   Component Value Date    NTBNP 182 (H) 04/24/2017     No results found for: \"NTBNPI\"   Lab Results   Component Value Date    UCRR 184 06/30/2022    UCRR 83 04/24/2017      Lab Results   Component Value Date    MICROL 15 06/30/2022    MICROL 6 04/24/2017      No results found for: \"MICROALBUMIN\"   Lab Results   Component Value Date    CRP 4.5 04/24/2017      Lab Results " "  Component Value Date    CHOL 280 (H) 06/30/2022    CHOL 288 (H) 04/24/2017      Lab Results   Component Value Date    TRIG 195 (H) 06/30/2022    TRIG 207 (H) 04/24/2017      Lab Results   Component Value Date    HDL 54 06/30/2022    HDL 55 04/24/2017      Lab Results   Component Value Date     (H) 06/30/2022     (H) 04/24/2017      No results found for: \"VLDL\"   No results found for: \"CHOLHDLRATIO\"  Lab Results   Component Value Date    NHDL 226 (H) 06/30/2022    NHDL 233 (H) 04/24/2017 2017 Calciuim screening  CONCLUSION:   1. CORONARY ARTERY CALCIUM SCORE: 27.  PERCENTILE 50-75th.   2. CORONARY ARTERY ATHEROSCLEROSIS IS PRESENT.   3. MODERATE RISK OF CARDIAC DISEASE BASED ON THIS TEST.     Assessment:    Cardiovascular:  Asymptomatic, she is not complaining of chest pain, EKG revealed junctional rhythm, HR 77 bpm, no plaque detected in carotid arteries    Blood Pressure:  She takes 25 mg of losartan and 6.25 mg of hydrochlorothiazide, -120/75-79 mmHg    Lipids:  She does not take a statin medication, intolerant to statin medications, recommend starting 10 mg of Zetia or Repatha. I left her a VM asking if she would be willing to start either medication     Latest Ref Rng 6/30/2022  7:25 AM 6/30/2022  8:02 AM 6/30/2022  8:03 AM 1/29/2023  6:30 PM 1/29/2023  6:45 PM   BP WT  CHOL         Cholesterol <200 mg/dL 280 (H)        HDL Cholesterol >=50 mg/dL 54        LDL Cholesterol Calculated <100 mg/dL 187 (H)        Triglycerides <150 mg/dL 195 (H)           Latest Ref Rng 1/29/2023  6:50 PM 1/29/2023  7:25 PM 1/29/2023  7:30 PM 2/7/2023  2:45 PM 2/7/2023  4:36 PM   BP WT  CHOL         Cholesterol <200 mg/dL        HDL Cholesterol >=50 mg/dL        LDL Cholesterol Calculated <100 mg/dL        Triglycerides <150 mg/dL           Latest Ref Rng 2/7/2023  5:48 PM 8/27/2024  9:26 AM 10/10/2024  1:23 PM   BP WT  CHOL       Cholesterol <200 mg/dL   291 (H)    HDL Cholesterol >=50 mg/dL   62    LDL " Cholesterol Calculated <100 mg/dL   195 (H)    Triglycerides <150 mg/dL   171 (H)       Glucose: 92 2023    Sleep pattern:  Sleep hygiene reviewed during clinic visit, handout given to patient    Weight Management: BMI 32.08, recommend consultation with comprehensive weight managment    Return to Clinic: 1-2 years    Health Habit Summary:  Nutrition: Heart Healthy Eating:  most of the time   Exercise:  not regularly active  Weight:  high-risk range  Tobacco Use:   remote history    This case was presented to Dr. Templeton and Dr. Samaria James during our weekly conference.     60 minutes spent on the date of the encounter doing (chart review/review of outside records/review of test results/interpretation of tests/patient visit/documentation/discussion with other provider(s)   MIREYA Ellison CNP       CC  Patient Care Team:  Lexy Escamilla as PCP - General (Physician Assistant)  Ab Hong APRN CNP as Nurse Practitioner (Cardiovascular Disease)  AB HONG Test Results    WALKING BLOOD PRESSURE RESPONSE (3 minute, 5 MET level walk)   Pre BP: 130/70 mmHg  3 min BP: 160/60 mmHg  1 min post BP: 130/85 mmHg    Pre HR: 87 bpm  3 min HR: 130 bpm  1 min post HR: 70 bpm     Test results: Walking blood pressure response for 3 minutes activity is in borderline range with increase in blood pressure from the baseline 30 points.      RETINAL VASCULAR ASSESSMENT   Left Eye Abnormality:  none  AV Ratio: 0.8    Right Eye Abnormality:  none  AV Ratio: 0.8     Retinal Assessment:  normal    ABDOMINAL AORTA ULTRASOUND (< 2.5 normal, borderline 2.5-2.9, abnormal > 3)   SupraIliac 1.83 cm    SupraRenal 1.62 cm    InfraRenal Proximal 1.85 cm    InfraRenal Distal 2.0 cm      Abdominal Aorta Assessment:  normal    LEFT VENTRICULAR ULTRASOUND MEASUREMENTS (adjusted for BSA)  LVIDD 46.4 mm   Septa 9 mm   Posterior 8.7 mm     Left Ventricular US Assessment:  normal    Carotid Artery IMT measurements report and  plaques in the small area examined:   Left IMT 0.782 mm  Plaques Small heterogeneous plaques in left bulb area size 2.5-2.8 mm    Right IMT 0.800 mm  Plaques Small heterogeneous plaques in right bulb area  size 1.0 - 2.8 mm     Test results: Carotid arteries wall thickening is in abnormal range due to plaque formations present.     ECG (see tracing):  normal sinus rhythm    Arterial Elasticity per age and gender (see printout):   C1 11.6 mL/mmHg x 10  normal   C2 2.5 mL/mmHg x 100 abnormal   Supine blood pressure: 143/85 mmHg     Test results: Arterial elasticity of the large size arteries is in normal range. Arterial elasticity of the small size arteries is in abnormal range.     Sheikh disease score:5    Alis Toure      Please do not hesitate to contact me if you have any questions/concerns.     Sincerely,     MIREYA Ellison CNP

## 2024-10-10 NOTE — PROGRESS NOTES
Sheikh Test Results    WALKING BLOOD PRESSURE RESPONSE (3 minute, 5 MET level walk)   Pre BP: ***/*** mmHg  3 min BP: ***/*** mmHg  1 min post BP: ***/*** mmHg    Pre HR: *** bpm  3 min HR: *** bpm  1 min post HR: *** bpm     RETINAL VASCULAR ASSESSMENT   Left Eye Abnormality:  none  AV Ratio: 0.8    Right Eye Abnormality:  none  AV Ratio: 0.8     Retinal Assessment:  normal    ABDOMINAL AORTA ULTRASOUND (< 2.5 normal, borderline 2.5-2.9, abnormal > 3)   SupraIliac *** cm    SupraRenal *** cm    InfraRenal Proximal *** cm    InfraRenal Distal *** cm      Abdominal Aorta Assessment:  normal    LEFT VENTRICULAR ULTRASOUND MEASUREMENTS (adjusted for BSA)  LVIDD *** mm   Septa *** mm   Posterior *** mm     Left Ventricular US Assessment:  normal    Carotid Artery IMT measurements report and plaques in the small area examined:   Left IMT *** mm  Plaques {none, mm:483387}    Right IMT *** mm  Plaques {none, mm:549164}     ECG (see tracing):  normal sinus rhythm    Arterial Elasticity per age and gender (see printout):   C1 *** mL/mmHg x 10  {normal, borderline, abnormal:278608}   C2 *** mL/mmHg x 100 {normal, borderline, abnormal:245486}   Supine blood pressure: ***/*** mmHg     Test results: Arterial elasticity of the large and small size arteries is in normal range after adjusting for age and gender.      Sheikh disease score:     Alis Toure

## 2024-10-11 LAB
CREAT UR-MCNC: 183 MG/DL
CRP SERPL HS-MCNC: 3.57 MG/L
MICROALBUMIN UR-MCNC: 16.9 MG/L
MICROALBUMIN/CREAT UR: 9.23 MG/G CR (ref 0–25)

## 2024-10-13 LAB
ATRIAL RATE - MUSE: 81 BPM
DIASTOLIC BLOOD PRESSURE - MUSE: NORMAL MMHG
INTERPRETATION ECG - MUSE: NORMAL
P AXIS - MUSE: NORMAL DEGREES
PR INTERVAL - MUSE: NORMAL MS
QRS DURATION - MUSE: 88 MS
QT - MUSE: 388 MS
QTC - MUSE: 439 MS
R AXIS - MUSE: -4 DEGREES
SYSTOLIC BLOOD PRESSURE - MUSE: NORMAL MMHG
T AXIS - MUSE: 17 DEGREES
VENTRICULAR RATE- MUSE: 77 BPM

## 2024-10-17 RX ORDER — EZETIMIBE 10 MG/1
10 TABLET ORAL DAILY
Qty: 90 TABLET | Refills: 3 | Status: SHIPPED | OUTPATIENT
Start: 2024-10-17

## 2024-10-17 NOTE — PATIENT INSTRUCTIONS
Screening Results Summary Report     [facility] for Cardiovascular Disease Prevention    Thank you for choosing to participate in the prevention screening offered at the [facility]. Prevention screening is important part of health care.  Atherosclerosis may result in heart attacks, strokes, heart failure, peripheral artery disease and shortened life expectancy. The risk for premature development of this disease is both genetic (family history) and environmental (diet, exercise, lifestyle, etc.).  Goals of your cardiovascular prevention screening include detecting the earliest signs of blood vessel or heart abnormalities, and identifying markers for risk that can be treated if identified early. Recommendations are included to improve health habits. In some cases medication may be recommended to help slow progression of disease. Our goal is to assist you in prevention of a heart attack, stroke and other cardiovascular diseases that are the major cause of illness and mortality in our society.    Your total cholesterol and LDL (bad cholesterol) results are not in the  optimal  range. Your other cholesterol numbers are at goal.  Target levels depend on the presence or absence of cardiovascular disease.   Your target level of total cholesterol are less than 200 mg/dL, LDL less than 100 mg/dL, HDL 40 ml/dL or higher, and a triglyceride level of less than 150 mg/dL.  We recommend continuation of ongoing health habit modification (heart healthy nutrition, maintaining your weight within a normal weight range and an exercise routine) to maintain these levels.  An ideal weight range is a body mass index of 25 or less.  You have not tolerated statin medications in the past.  We recommend starting 10 mg of Zeita or starting a newer medication in a different class, Repatha. This medication is injected every 2 weeks. An order has been sent to your local pharmacy.  An order has been placed in your electronic medical  record to recheck your fasting lipid profile in 10-12 week after starting this medication.   Please call [phone] to schedule a lab appointment.      2.  A BMI (body mass index) of over 30 increases the risk for heart attack, stroke, and diabetes.  Your BMI is 32.08 and adds to your risk.  A BMI of less than 30 is recommended.  The closer you can get to a BMI of 25 and still feel healthy for your musculature and frame size the better.  Weight reduction, even small amounts (5-10 pounds) can help reduce cholesterol levels.  For men, 2-3 pounds/month and for women, 1-2 pounds/month of weight loss is recommended with an initial goal of approximately 10% reduction of your body weight per year.  Slow weight loss will increase the chances that you will maintain your weight over time.  A dramatic fluctuation in weight (up and down weight) increases your cardiovascular risk and is not recommended.  Overall, a healthy stable weight is preferred.  We recommend a consultation with the [facility] Weight [facility] program.  An order has been placed in your electronic medical chart for this consultation. You can schedule the consultation by calling [phone].    3.  Your arterial elasticity (artery stiffness) is low and is consistent with abnormalities associated with the development of vascular (blood vessel) disease and high blood pressure. Statin cholesterol medications, some blood pressure medications and healthy living habits particularly exercise are helpful to preserve artery elasticity. Monitor your blood pressure once/week, record the results and bring those results with you to your next primary care clinic visit.  Notify your primary care provider if you notice that your blood pressure readings are increasing. Omron is good home blood pressure monitor brand to purchase.  This blood pressure cuff machine can be purchased at [employer], [employer] or [employer].     4.  Your blood pressure with 3 minutes  of moderate (5 met level) treadmill walking increased 30 mmHg (--> systolic-top number) indicating an abnormal rise. This rise in blood pressure can be related to elevated weight, lack of physical exercise/fitness or reduced blood vessel function, which is linked to the presence of or the risk of development of high blood pressure over time. No treatment is needed at this time.      5.  Your Carotid IMT (see test description for explanation) is thickened and increases cardiovascular risk.   In the small area that was visualized, small plaque (fatty deposits) were identified. Good blood flow continues with these small plaques but is it important to prevent further growth over time. Statin cholesterol medications and healthy living habits are important to help prevent growth of plaques over time.  We would like to repeat this measurement with your next exam to see if it is changing over time.    6.  Your diet is fairly heart healthy and well balanced. We recommend increasing your intake of vegetables to 4-5 servings/day and increasing your fruit intake to 3-4 sevings/day. Incorporating healthy fats of the Mediterranean diet into your diet is also a healthy idea. Eating salmon and extra virgin olive oit are good examples of healthy fats. Nutrients found in fruit, vegetables and whole grains have been shown to be beneficial for the long-term health of your heart and blood vessels. Monitoring your portion sizes is also a good idea.    7.  The American Heart Association recommends 150 minutes of exercise per week, including strength (resistance) training.  Regular exercise can help maintain or lower cholesterol, blood pressure, blood glucose and improve the health of your heart and blood vessels. We recommend increasing your exercise routine to 5 days per week and adding a cardiovascular component to your exercise routine.  Always exercise within your comfort zone (no chest pain, able to talk comfortably).     8.  We  suggest that you consider incorporating 4-7-8 relaxation breathing, mindfulness stress reduction, meditation, yoga,and/or aromatherapy into your healthy lifestyle routine. All of these integrative therapies have been shown to be useful in reducing stress and promoting health. The website for the [facility] at the [facility] is [email or web address]. Taking Charge of Your Health and Wellbeing is a wonderful assessment tool to learn more about your wellbeing.    9.  Return to clinic in 1-2 years.    Thank you for choosing to participate in the prevention screening at [facility].  Cardiovascular prevention screening is important. Atherosclerosis may result in heart attacks, strokes, heart failure, peripheral artery disease.    Devi Gallegos, DNP, APRN, FNP-C

## 2025-07-05 ENCOUNTER — HEALTH MAINTENANCE LETTER (OUTPATIENT)
Age: 75
End: 2025-07-05

## 2025-08-16 ENCOUNTER — HEALTH MAINTENANCE LETTER (OUTPATIENT)
Age: 75
End: 2025-08-16